# Patient Record
Sex: MALE | Race: WHITE | NOT HISPANIC OR LATINO
[De-identification: names, ages, dates, MRNs, and addresses within clinical notes are randomized per-mention and may not be internally consistent; named-entity substitution may affect disease eponyms.]

---

## 2018-11-19 PROBLEM — Z00.00 ENCOUNTER FOR PREVENTIVE HEALTH EXAMINATION: Status: ACTIVE | Noted: 2018-11-19

## 2019-02-04 ENCOUNTER — MEDICATION RENEWAL (OUTPATIENT)
Age: 74
End: 2019-02-04

## 2019-02-12 PROBLEM — M19.90 ARTHRITIS: Status: ACTIVE | Noted: 2019-02-12

## 2019-02-12 PROBLEM — Z87.891 FORMER SMOKER: Status: ACTIVE | Noted: 2019-02-12

## 2019-02-12 PROBLEM — Z85.79 HISTORY OF LYMPHOMA: Status: RESOLVED | Noted: 2019-02-12 | Resolved: 2019-02-12

## 2019-02-12 PROBLEM — Z86.19 HISTORY OF LYME DISEASE: Status: RESOLVED | Noted: 2019-02-12 | Resolved: 2019-02-12

## 2019-02-12 RX ORDER — ASPIRIN 81 MG
81 TABLET, DELAYED RELEASE (ENTERIC COATED) ORAL DAILY
Refills: 0 | Status: ACTIVE | COMMUNITY

## 2019-02-12 RX ORDER — UBIDECARENONE/VIT E ACET 100MG-5
50 MCG CAPSULE ORAL
Refills: 0 | Status: ACTIVE | COMMUNITY

## 2019-02-19 ENCOUNTER — APPOINTMENT (OUTPATIENT)
Dept: HEART AND VASCULAR | Facility: CLINIC | Age: 74
End: 2019-02-19
Payer: MEDICARE

## 2019-02-19 ENCOUNTER — NON-APPOINTMENT (OUTPATIENT)
Age: 74
End: 2019-02-19

## 2019-02-19 VITALS
OXYGEN SATURATION: 98 % | BODY MASS INDEX: 25.34 KG/M2 | HEIGHT: 70.5 IN | HEART RATE: 70 BPM | DIASTOLIC BLOOD PRESSURE: 83 MMHG | WEIGHT: 179 LBS | SYSTOLIC BLOOD PRESSURE: 142 MMHG

## 2019-02-19 VITALS — SYSTOLIC BLOOD PRESSURE: 114 MMHG | DIASTOLIC BLOOD PRESSURE: 81 MMHG

## 2019-02-19 DIAGNOSIS — Z85.79 PERSONAL HISTORY OF OTHER MALIGNANT NEOPLASMS OF LYMPHOID, HEMATOPOIETIC AND RELATED TISSUES: ICD-10-CM

## 2019-02-19 DIAGNOSIS — M19.90 UNSPECIFIED OSTEOARTHRITIS, UNSPECIFIED SITE: ICD-10-CM

## 2019-02-19 DIAGNOSIS — Z87.891 PERSONAL HISTORY OF NICOTINE DEPENDENCE: ICD-10-CM

## 2019-02-19 DIAGNOSIS — Z86.19 PERSONAL HISTORY OF OTHER INFECTIOUS AND PARASITIC DISEASES: ICD-10-CM

## 2019-02-19 PROCEDURE — 93000 ELECTROCARDIOGRAM COMPLETE: CPT

## 2019-02-19 PROCEDURE — 99214 OFFICE O/P EST MOD 30 MIN: CPT | Mod: 25

## 2019-02-19 RX ORDER — PYRIDOXINE HCL (VITAMIN B6) 100 MG
100 TABLET ORAL
Refills: 0 | Status: ACTIVE | COMMUNITY

## 2019-02-19 RX ORDER — CHLORHEXIDINE GLUCONATE 4 %
1000 LIQUID (ML) TOPICAL
Refills: 0 | Status: ACTIVE | COMMUNITY

## 2019-02-19 NOTE — HISTORY OF PRESENT ILLNESS
[FreeTextEntry1] : Mr. Villalobos presents for follow up and management of moderate diffuse CAD, HTN, and pre-excitation (WPW) ECG pattern. He is following with a nephrologist at [facility] for diabetic nephropathy.  He had complaints of back pain and had an MRI that demonstrated a mass. 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 He is following with an oncologist, Sun Dye MD, at [facility] and received 3 rounds of chemotherapy which he tolerated well.  He denies chest pain or palpitations and feels generally well.  He is nearly finished with his 6 cycle course of chemotherapy (vincristine and rituxan).  His most recent PET scan revealed near resolution of the lymphoma.  At present, he feels well.  He recently returned from a trip to [place].

## 2019-02-19 NOTE — REASON FOR VISIT
[Follow-Up - Clinic] : a clinic follow-up of [FreeTextEntry1] : Diagnostic Tests:\par -----------------------------------\par ECG: \par 02/19/19: sinus rhythm, WPW pattern.  \par 05/14/18: sinus bradycardia, WPW pattern.\par 10/07/15: sinus bradycardia, WPW pattern.\par 11/18/14: NSR, LAD, WPW pattern\par 12/24/13: NSR, LAD, WPW pattern\par 10/04/11: sinus bradycardia, WPW pattern. \par -----------------------------------\par Echo: \par 09/11/18: EF 66%, mild MR, moderate TR, GLS -19.8%. \par 05/25/18: EF 61%, trace MR/TR. \par 05/14/09: EF 59%, dilated LA, mild MR/TR\par -----------------------------------\par Stress: \par 04/29/08: exercise MPI: 13 METS, EF 54%, partially reversible inferior wall defect\par -----------------------------------\par Cath: \par 05/13/08: RCA prox 50%, LAD mid 50%, D1 ostium 50%, LCX mid 60%, OM2 40%, EF 60%.

## 2019-02-19 NOTE — ASSESSMENT
[FreeTextEntry1] : 1. Pre-excitation syndrome: Pre-excitation (WPW) pattern on ECG: asymptomatic\par             - consider Holter monitor in future to screen for SVT or atrial fibrillation \par \par 2. CAD: s/p cath: 05/13/08: RCA prox 50%, LAD mid 50%, D1 ostium 50%, LCX mid 60%, OM2 40%, EF 60%, CCS 0 \par             - continue aggressive medical management\par             - continue ASA 81mg po daily\par             - will send for an echocardiogram \par \par 3. HTN: BP at ACC/AHA 2017 guideline target\par             - continue valsartan/HCT 80/12.5mg 0.5 tablet po daily\par \par 4. Dyslipidemia: lipids at goal\par             - continue rosuvastatin 40mg po daily\par             - discussed with patient therapeutic lifestyle changes to improve lipid metabolism.\par \par 5. Diabetes type II:  \par             - continue trulicity\par             - discussed with patient therapeutic lifestyle changes to improve glucose metabolism\par \par 6. Non-Hodgkin lymphoma: Spinal tumor, low-grade lymphoma, s/p surgical excision: \par             - follow up with oncologist, Sun Dye MD at [place]. \par \par

## 2019-02-19 NOTE — PHYSICAL EXAM
[General Appearance - Well Developed] : well developed [Normal Appearance] : normal appearance [Well Groomed] : well groomed [General Appearance - Well Nourished] : well nourished [No Deformities] : no deformities [General Appearance - In No Acute Distress] : no acute distress [Normal Conjunctiva] : the conjunctiva exhibited no abnormalities [Eyelids - No Xanthelasma] : the eyelids demonstrated no xanthelasmas [Normal Oral Mucosa] : normal oral mucosa [No Oral Pallor] : no oral pallor [No Oral Cyanosis] : no oral cyanosis [Normal Jugular Venous A Waves Present] : normal jugular venous A waves present [Normal Jugular Venous V Waves Present] : normal jugular venous V waves present [No Jugular Venous Garcia A Waves] : no jugular venous garcai A waves [Respiration, Rhythm And Depth] : normal respiratory rhythm and effort [Exaggerated Use Of Accessory Muscles For Inspiration] : no accessory muscle use [Auscultation Breath Sounds / Voice Sounds] : lungs were clear to auscultation bilaterally [Normal Rate] : normal [Normal S1] : normal S1 [Normal S2] : normal S2 [No Murmur] : no murmurs heard [2+] : left 2+ [No Abnormalities] : the abdominal aorta was not enlarged and no bruit was heard [No Pitting Edema] : no pitting edema present [Abdomen Soft] : soft [Abdomen Tenderness] : non-tender [Abdomen Mass (___ Cm)] : no abdominal mass palpated [Abnormal Walk] : normal gait [Gait - Sufficient For Exercise Testing] : the gait was sufficient for exercise testing [Nail Clubbing] : no clubbing of the fingernails [Cyanosis, Localized] : no localized cyanosis [Petechial Hemorrhages (___cm)] : no petechial hemorrhages [Skin Color & Pigmentation] : normal skin color and pigmentation [] : no rash [No Venous Stasis] : no venous stasis [Skin Lesions] : no skin lesions [No Skin Ulcers] : no skin ulcer [No Xanthoma] : no  xanthoma was observed [Oriented To Time, Place, And Person] : oriented to person, place, and time [Affect] : the affect was normal [Mood] : the mood was normal [No Anxiety] : not feeling anxious [S3] : no S3 [S4] : no S4 [Right Carotid Bruit] : no bruit heard over the right carotid [Left Carotid Bruit] : no bruit heard over the left carotid [Right Femoral Bruit] : no bruit heard over the right femoral artery [Left Femoral Bruit] : no bruit heard over the left femoral artery

## 2019-02-27 ENCOUNTER — APPOINTMENT (OUTPATIENT)
Dept: HEART AND VASCULAR | Facility: CLINIC | Age: 74
End: 2019-02-27
Payer: MEDICARE

## 2019-02-27 VITALS
BODY MASS INDEX: 24.77 KG/M2 | TEMPERATURE: 98 F | DIASTOLIC BLOOD PRESSURE: 68 MMHG | HEIGHT: 70.5 IN | OXYGEN SATURATION: 99 % | SYSTOLIC BLOOD PRESSURE: 128 MMHG | WEIGHT: 175 LBS | HEART RATE: 68 BPM

## 2019-02-27 PROCEDURE — 93306 TTE W/DOPPLER COMPLETE: CPT

## 2019-06-20 ENCOUNTER — APPOINTMENT (OUTPATIENT)
Dept: HEART AND VASCULAR | Facility: CLINIC | Age: 74
End: 2019-06-20
Payer: MEDICARE

## 2019-06-20 VITALS
DIASTOLIC BLOOD PRESSURE: 71 MMHG | OXYGEN SATURATION: 100 % | WEIGHT: 178 LBS | HEIGHT: 70 IN | SYSTOLIC BLOOD PRESSURE: 135 MMHG | HEART RATE: 58 BPM | BODY MASS INDEX: 25.48 KG/M2 | TEMPERATURE: 98.2 F

## 2019-06-20 VITALS — DIASTOLIC BLOOD PRESSURE: 76 MMHG | SYSTOLIC BLOOD PRESSURE: 124 MMHG

## 2019-06-20 PROCEDURE — 99214 OFFICE O/P EST MOD 30 MIN: CPT

## 2019-06-20 NOTE — ASSESSMENT
[FreeTextEntry1] : 1. Pre-excitation syndrome: Pre-excitation (WPW) pattern on ECG: asymptomatic\par             - consider Holter monitor in future to screen for SVT or atrial fibrillation \par \par 2. CAD: s/p cath: 05/13/08: RCA prox 50%, LAD mid 50%, D1 ostium 50%, LCX mid 60%, OM2 40%, EF 60%, CCS 0 \par             - continue aggressive medical management\par             - continue ASA 81mg po daily\par \par 3. HTN: BP at ACC/AHA 2017 guideline target\par             - continue valsartan/HCT 80/12.5mg 0.5 tablet po daily\par \par 4. Dyslipidemia: lipids at goal\par             - continue rosuvastatin 40mg po daily\par             - discussed with patient therapeutic lifestyle changes to improve lipid metabolism.\par \par 5. Diabetes type II:  \par             - continue trulicity\par             - discussed with patient therapeutic lifestyle changes to improve glucose metabolism\par \par 6. Non-Hodgkin lymphoma: Spinal tumor, low-grade lymphoma, s/p surgical excision: \par             - follow up with oncologist, Sun Dye MD at [place]. \par \par 7. Mitral regurgitation: moderate:\par             - will follow with serial echocardiograms\par \par 8. CKD stage IV:\par             - follow up with nephrologist\par             - avoid nephrotoxic agents\par \par 9. r/o SOPHIA\par             - will send for a carotid artery sonogram \par \par

## 2019-06-20 NOTE — HISTORY OF PRESENT ILLNESS
[FreeTextEntry1] : Mr. Villalobos presents for follow up and management of moderate diffuse CAD, HTN, and pre-excitation (WPW) ECG pattern. He is following with a nephrologist at [facility] for diabetic nephropathy.  He had complaints of back pain and had an MRI that demonstrated a mass. 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 He is following with an oncologist, Sun Dye MD, at [facility] and received 3 rounds of chemotherapy which he tolerated well.  He denies chest pain or palpitations and feels generally well.  He is nearly finished with his 6 cycle course of chemotherapy (vincristine and rituxan).  His most recent PET scan revealed near resolution of the lymphoma.  At present, he feels well.

## 2019-06-20 NOTE — REASON FOR VISIT
[FreeTextEntry1] : Diagnostic Tests:\par -----------------------------------\par ECG: \par 02/19/19: sinus rhythm, WPW pattern.  \par 05/14/18: sinus bradycardia, WPW pattern.\par 10/07/15: sinus bradycardia, WPW pattern.\par 11/18/14: NSR, LAD, WPW pattern\par 12/24/13: NSR, LAD, WPW pattern\par 10/04/11: sinus bradycardia, WPW pattern. \par -----------------------------------\par Echo: \par 02/27/19: EF 58%, moderately dilated LA, moderate MR, trace AI, mild TR.\par 09/11/18: EF 66%, mild MR, moderate TR, GLS -19.8%. \par 05/25/18: EF 61%, trace MR/TR. \par 05/14/09: EF 59%, dilated LA, mild MR/TR\par -----------------------------------\par Stress: \par 04/29/08: exercise MPI: 13 METS, EF 54%, partially reversible inferior wall defect\par -----------------------------------\par Cath: \par 05/13/08: RCA prox 50%, LAD mid 50%, D1 ostium 50%, LCX mid 60%, OM2 40%, EF 60%.

## 2019-06-26 ENCOUNTER — APPOINTMENT (OUTPATIENT)
Dept: HEART AND VASCULAR | Facility: CLINIC | Age: 74
End: 2019-06-26
Payer: MEDICARE

## 2019-06-26 VITALS
OXYGEN SATURATION: 97 % | TEMPERATURE: 98.4 F | SYSTOLIC BLOOD PRESSURE: 124 MMHG | HEART RATE: 60 BPM | DIASTOLIC BLOOD PRESSURE: 69 MMHG

## 2019-06-26 VITALS — SYSTOLIC BLOOD PRESSURE: 125 MMHG | DIASTOLIC BLOOD PRESSURE: 69 MMHG

## 2019-06-26 PROCEDURE — 93880 EXTRACRANIAL BILAT STUDY: CPT

## 2019-06-27 DIAGNOSIS — E04.1 NONTOXIC SINGLE THYROID NODULE: ICD-10-CM

## 2019-07-09 ENCOUNTER — OUTPATIENT (OUTPATIENT)
Dept: OUTPATIENT SERVICES | Facility: HOSPITAL | Age: 74
LOS: 1 days | End: 2019-07-09

## 2019-07-09 ENCOUNTER — APPOINTMENT (OUTPATIENT)
Dept: ULTRASOUND IMAGING | Facility: CLINIC | Age: 74
End: 2019-07-09
Payer: MEDICARE

## 2019-07-09 PROCEDURE — 76536 US EXAM OF HEAD AND NECK: CPT | Mod: 26

## 2019-07-24 ENCOUNTER — MEDICATION RENEWAL (OUTPATIENT)
Age: 74
End: 2019-07-24

## 2019-10-22 ENCOUNTER — APPOINTMENT (OUTPATIENT)
Dept: HEART AND VASCULAR | Facility: CLINIC | Age: 74
End: 2019-10-22
Payer: MEDICARE

## 2019-10-22 ENCOUNTER — NON-APPOINTMENT (OUTPATIENT)
Age: 74
End: 2019-10-22

## 2019-10-22 VITALS
HEIGHT: 70 IN | BODY MASS INDEX: 25.2 KG/M2 | HEART RATE: 63 BPM | TEMPERATURE: 98.1 F | OXYGEN SATURATION: 98 % | DIASTOLIC BLOOD PRESSURE: 74 MMHG | SYSTOLIC BLOOD PRESSURE: 125 MMHG | WEIGHT: 176 LBS

## 2019-10-22 PROCEDURE — 93000 ELECTROCARDIOGRAM COMPLETE: CPT

## 2019-10-22 PROCEDURE — 99214 OFFICE O/P EST MOD 30 MIN: CPT | Mod: 25

## 2019-10-22 NOTE — REASON FOR VISIT
[FreeTextEntry1] : Diagnostic Tests:\par -----------------------------------\par ECG: \par 10/22/19: sinus rhythm, WPW pattern, PVC. \par 02/19/19: sinus rhythm, WPW pattern.  \par 05/14/18: sinus bradycardia, WPW pattern.\par 10/07/15: sinus bradycardia, WPW pattern.\par 11/18/14: NSR, LAD, WPW pattern\par 12/24/13: NSR, LAD, WPW pattern\par 10/04/11: sinus bradycardia, WPW pattern. \par -----------------------------------\par Echo: \par 02/27/19: EF 58%, moderately dilated LA, moderate MR, trace AI, mild TR.\par 09/11/18: EF 66%, mild MR, moderate TR, GLS -19.8%. \par 05/25/18: EF 61%, trace MR/TR. \par 05/14/09: EF 59%, dilated LA, mild MR/TR\par -----------------------------------\par Stress: \par 04/29/08: exercise MPI: 13 METS, EF 54%, partially reversible inferior wall defect\par -----------------------------------\par Cath: \par 05/13/08: RCA prox 50%, LAD mid 50%, D1 ostium 50%, LCX mid 60%, OM2 40%, EF 60%.\par -----------------------------------\par Carotid:\par 06/26/19: sono: LICA 20-49%, SUNITA 1-19%, + thyroid cysts.

## 2019-10-22 NOTE — HISTORY OF PRESENT ILLNESS
[FreeTextEntry1] : Mr. Villalobos presents for follow up and management of moderate diffuse CAD, HTN, and pre-excitation (WPW) ECG pattern. He is following with a nephrologist at [facility] for diabetic nephropathy.  He had complaints of back pain and had an MRI that demonstrated a mass. 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 He is following with an oncologist, Sun Dye MD, at [facility] and received 3 rounds of chemotherapy which he tolerated well.  He denies chest pain or palpitations and feels generally well.  He completed a 6 cycle course of chemotherapy (vincristine and rituxan).  At present, he feels well overall with no cardiovascular complaints. He is going to a sleep institute for chronic insomnia which is worsening. He admits to waking up every hour at night.

## 2019-10-22 NOTE — ASSESSMENT
[FreeTextEntry1] : 1. Pre-excitation syndrome: Pre-excitation (WPW) pattern on ECG: asymptomatic\par             - consider Holter monitor in future to screen for SVT or atrial fibrillation \par \par 2. CAD: s/p cath: 05/13/08: RCA prox 50%, LAD mid 50%, D1 ostium 50%, LCX mid 60%, OM2 40%, EF 60%, CCS 0 \par             - continue aggressive medical management\par             - continue ASA 81mg po daily\par \par 3. HTN: BP at ACC/AHA 2017 guideline target\par             - continue valsartan/HCT 80/12.5mg 0.5 tablet po daily\par \par 4. Dyslipidemia: lipids at goal\par             - continue rosuvastatin 40mg po daily\par             - discussed with patient therapeutic lifestyle changes to improve lipid metabolism.\par \par 5. Diabetes type II:  \par             - continue trulicity\par             - discussed with patient therapeutic lifestyle changes to improve glucose metabolism\par \par 6. Non-Hodgkin lymphoma: Spinal tumor, low-grade lymphoma, s/p surgical excision: \par             - follow up with oncologist, Sun Dye MD at [place]. \par \par 7. Mitral regurgitation: moderate:\par             - will follow with serial echocardiograms\par \par 8. CKD stage IV:\par             - follow up with nephrologist, Dr. Ohara\par             - avoid nephrotoxic agents\par \par 9. SOPHIA: 06/26/19: sono: LICA 20-49%, SUNITA 1-19%, + thyroid cysts. \par             - will follow with serial sonograms \par \par

## 2019-12-18 ENCOUNTER — RX RENEWAL (OUTPATIENT)
Age: 74
End: 2019-12-18

## 2020-05-05 ENCOUNTER — APPOINTMENT (OUTPATIENT)
Dept: HEART AND VASCULAR | Facility: CLINIC | Age: 75
End: 2020-05-05

## 2020-07-07 ENCOUNTER — RX RENEWAL (OUTPATIENT)
Age: 75
End: 2020-07-07

## 2020-10-14 ENCOUNTER — RX RENEWAL (OUTPATIENT)
Age: 75
End: 2020-10-14

## 2020-11-03 ENCOUNTER — NON-APPOINTMENT (OUTPATIENT)
Age: 75
End: 2020-11-03

## 2020-11-03 ENCOUNTER — APPOINTMENT (OUTPATIENT)
Dept: HEART AND VASCULAR | Facility: CLINIC | Age: 75
End: 2020-11-03
Payer: MEDICARE

## 2020-11-03 VITALS — SYSTOLIC BLOOD PRESSURE: 134 MMHG | DIASTOLIC BLOOD PRESSURE: 82 MMHG

## 2020-11-03 VITALS
WEIGHT: 179 LBS | SYSTOLIC BLOOD PRESSURE: 142 MMHG | HEART RATE: 60 BPM | OXYGEN SATURATION: 99 % | BODY MASS INDEX: 25.62 KG/M2 | TEMPERATURE: 97.9 F | HEIGHT: 70 IN | DIASTOLIC BLOOD PRESSURE: 72 MMHG

## 2020-11-03 PROCEDURE — 93000 ELECTROCARDIOGRAM COMPLETE: CPT

## 2020-11-03 PROCEDURE — 99214 OFFICE O/P EST MOD 30 MIN: CPT | Mod: 25

## 2020-11-03 NOTE — REASON FOR VISIT
[FreeTextEntry1] : Diagnostic Tests:\par -----------------------------------\par ECG: \par 11/03/20: sinus rhythm, WPW pattern.\par 10/22/19: sinus rhythm, WPW pattern, PVC. \par 02/19/19: sinus rhythm, WPW pattern.  \par 05/14/18: sinus bradycardia, WPW pattern.\par 10/07/15: sinus bradycardia, WPW pattern.\par 11/18/14: NSR, LAD, WPW pattern\par 12/24/13: NSR, LAD, WPW pattern\par 10/04/11: sinus bradycardia, WPW pattern. \par -----------------------------------\par Echo: \par 02/27/19: EF 58%, moderately dilated LA, moderate MR, trace AI, mild TR.\par 09/11/18: EF 66%, mild MR, moderate TR, GLS -19.8%. \par 05/25/18: EF 61%, trace MR/TR. \par 05/14/09: EF 59%, dilated LA, mild MR/TR\par -----------------------------------\par Stress: \par 04/29/08: exercise MPI: 13 METS, EF 54%, partially reversible inferior wall defect\par -----------------------------------\par Cath: \par 05/13/08: RCA prox 50%, LAD mid 50%, D1 ostium 50%, LCX mid 60%, OM2 40%, EF 60%.\par -----------------------------------\par Carotid:\par 06/26/19: sono: LICA 20-49%, SUNITA 1-19%, + thyroid cysts.

## 2020-11-03 NOTE — ASSESSMENT
[FreeTextEntry1] : 1. Pre-excitation syndrome: Pre-excitation (WPW) pattern on ECG: asymptomatic\par             - continue conservative management\par \par 2. CAD: s/p cath: 05/13/08: RCA prox 50%, LAD mid 50%, D1 ostium 50%, LCX mid 60%, OM2 40%, EF 60%\par             - continue aggressive medical management\par             - continue ASA 81mg po daily\par             - will send for an echocardiogram\par \par 3. HTN: BP at ACC/AHA 2017 guideline target\par             - continue valsartan/HCT 80/12.5mg 0.5 tablet po daily\par \par 4. Dyslipidemia: lipids at goal, LDL 71 (10/26/20)\par             - continue rosuvastatin 40mg po daily\par             - discussed with patient therapeutic lifestyle changes to improve lipid metabolism.\par \par 5. Diabetes type II:  \par             - continue trulicity 0.75mg SQ weekly\par             - continue glimepiride 1mg p QD \par             - discussed with patient therapeutic lifestyle changes to improve glucose metabolism\par \par 6. Non-Hodgkin lymphoma: Spinal tumor, low-grade lymphoma, s/p surgical excision: \par             - follow up with oncologist, Sun Dye MD at [place]. \par \par 7. Mitral regurgitation: moderate:\par             - will follow with serial echocardiograms\par \par 8. CKD stage IV:Cr 2.47, GFR 25 (10/26/20)\par             - follow up with nephrologist, Eddie Ohara MD\par             - avoid nephrotoxic agents\par \par 9. SOPHIA: 06/26/19: sono: LICA 20-49%, SUNITA 1-19%, + thyroid cysts. \par             - will follow with serial sonograms (ordered today)\par \par

## 2020-11-03 NOTE — HISTORY OF PRESENT ILLNESS
[FreeTextEntry1] : Mr. Villalobos presents for follow up and management of moderate diffuse CAD, HTN, and pre-excitation (WPW) ECG pattern. He is following with a nephrologist at [facility] for diabetic nephropathy.  He had complaints of back pain and had an MRI that demonstrated a mass. 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 He is following with an oncologist, Sun Dye MD, at [facility] and received 3 rounds of chemotherapy which he tolerated well.  He denies chest pain or palpitations and feels generally well.  He completed a 6 cycle course of chemotherapy (vincristine and rituxan).  At present, he feels well overall with no cardiovascular complaints.

## 2020-11-18 ENCOUNTER — APPOINTMENT (OUTPATIENT)
Dept: HEART AND VASCULAR | Facility: CLINIC | Age: 75
End: 2020-11-18
Payer: MEDICARE

## 2020-11-18 ENCOUNTER — NON-APPOINTMENT (OUTPATIENT)
Age: 75
End: 2020-11-18

## 2020-11-18 PROCEDURE — 93880 EXTRACRANIAL BILAT STUDY: CPT

## 2020-11-18 PROCEDURE — 93306 TTE W/DOPPLER COMPLETE: CPT

## 2020-11-18 PROCEDURE — ZZZZZ: CPT

## 2021-07-13 ENCOUNTER — RX RENEWAL (OUTPATIENT)
Age: 76
End: 2021-07-13

## 2021-09-27 ENCOUNTER — RX RENEWAL (OUTPATIENT)
Age: 76
End: 2021-09-27

## 2021-12-08 ENCOUNTER — RX RENEWAL (OUTPATIENT)
Age: 76
End: 2021-12-08

## 2021-12-10 ENCOUNTER — RX RENEWAL (OUTPATIENT)
Age: 76
End: 2021-12-10

## 2021-12-28 ENCOUNTER — RX RENEWAL (OUTPATIENT)
Age: 76
End: 2021-12-28

## 2022-01-04 ENCOUNTER — APPOINTMENT (OUTPATIENT)
Dept: HEART AND VASCULAR | Facility: CLINIC | Age: 77
End: 2022-01-04
Payer: MEDICARE

## 2022-01-04 ENCOUNTER — NON-APPOINTMENT (OUTPATIENT)
Age: 77
End: 2022-01-04

## 2022-01-04 VITALS
SYSTOLIC BLOOD PRESSURE: 121 MMHG | HEART RATE: 61 BPM | BODY MASS INDEX: 22.12 KG/M2 | TEMPERATURE: 96.9 F | HEIGHT: 70 IN | DIASTOLIC BLOOD PRESSURE: 72 MMHG | OXYGEN SATURATION: 100 % | WEIGHT: 154.5 LBS

## 2022-01-04 PROCEDURE — 93000 ELECTROCARDIOGRAM COMPLETE: CPT

## 2022-01-04 PROCEDURE — 99214 OFFICE O/P EST MOD 30 MIN: CPT | Mod: 25

## 2022-01-04 RX ORDER — INSULIN GLARGINE 100 [IU]/ML
100 INJECTION, SOLUTION SUBCUTANEOUS
Qty: 18 | Refills: 0 | Status: ACTIVE | COMMUNITY
Start: 2021-12-16

## 2022-01-04 NOTE — ASSESSMENT
[FreeTextEntry1] : 1. Pre-excitation syndrome: Pre-excitation (WPW) pattern on ECG: asymptomatic\par             - continue conservative management\par \par 2. CAD: s/p cath: 05/13/08: RCA prox 50%, LAD mid 50%, D1 ostium 50%, LCX mid 60%, OM2 40%, EF 60%\par             - continue aggressive medical management\par             - continue ASA 81mg po daily\par             - will send for an echocardiogram to assess LV function\par \par 3. HTN: BP at ACC/AHA 2017 guideline target\par             - continue valsartan/HCT 80/12.5mg 0.5 tablet po daily\par \par 4. Dyslipidemia: lipids at goal: \par             - continue rosuvastatin 40mg po daily\par             - discussed with patient therapeutic lifestyle changes to improve lipid metabolism\par             - patient will provide copy of recent lab work from PMD's office for my review \par \par 5. Diabetes type II:  \par             - continue trulicity 1.5mg SQ weekly\par             - continue insulin\par             - discussed with patient therapeutic lifestyle changes to improve glucose metabolism\par             - follow up with endocrinologist, Demetrio White MD at [facility]\par \par 6. Non-Hodgkin lymphoma: Spinal tumor, low-grade lymphoma, s/p surgical excision: \par             - follow up with oncologist, Sun Dye MD at [place]. \par \par 7. Mitral regurgitation: moderate:\par             - will follow with serial echocardiograms (ordered today) \par \par 8. CKD stage IV:Cr 2.47, GFR 24 (10/01/21)\par             - follow up with nephrologist, Eddie Ohara MD at [facility]\par             - avoid nephrotoxic agents\par \par 9. SOPHIA: 01/18/20: sono: LICA 20-49%, SUNITA 1-19%, + thyroid cysts. \par             - will follow with serial sonograms \par \par \par An ECG was obtained to evaluate heart rhythm and screen for any underlying cardiac abnormalities. \par

## 2022-01-04 NOTE — HISTORY OF PRESENT ILLNESS
[FreeTextEntry1] : Mr. Villalobos presents for follow up and management of moderate diffuse CAD, HTN, and pre-excitation (WPW) ECG pattern. He is following with a nephrologist at [facility] for diabetic nephropathy.  He had complaints of back pain and had an MRI that demonstrated a mass. 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 He is following with an oncologist, Sun Dye MD, at [facility]. He completed a 6 cycle course of chemotherapy (vincristine and rituxan).  He had an echocardiogram on 11/18/20 which revealed an EF of 60% and mild MR.  At present, he feels well overall with no cardiovascular complaints. He will travel for two weeks to [place] and 1.5 months to [place] during the winter.

## 2022-01-04 NOTE — REASON FOR VISIT
[FreeTextEntry1] : Diagnostic Tests:\par -----------------------------------\par ECG: \par 01/04/22: sinus rhythm, WPW pattern.\par 11/03/20: sinus rhythm, WPW pattern.\par 10/22/19: sinus rhythm, WPW pattern, PVC. \par 02/19/19: sinus rhythm, WPW pattern.  \par 05/14/18: sinus bradycardia, WPW pattern.\par 10/07/15: sinus bradycardia, WPW pattern.\par 11/18/14: NSR, LAD, WPW pattern\par 12/24/13: NSR, LAD, WPW pattern\par 10/04/11: sinus bradycardia, WPW pattern. \par -----------------------------------\par Echo: \par 11/18/20: EF 60%,mild MR. \par 02/27/19: EF 58%, moderately dilated LA, moderate MR, trace AI, mild TR.\par 09/11/18: EF 66%, mild MR, moderate TR, GLS -19.8%. \par 05/25/18: EF 61%, trace MR/TR. \par 05/14/09: EF 59%, dilated LA, mild MR/TR\par -----------------------------------\par Stress: \par 04/29/08: exercise MPI: 13 METS, EF 54%, partially reversible inferior wall defect\par -----------------------------------\par Cath: \par 05/13/08: RCA prox 50%, LAD mid 50%, D1 ostium 50%, LCX mid 60%, OM2 40%, EF 60%.\par -----------------------------------\par Carotid:\par 1/18/20: sono: b/l ICA 1-19% stenosis. \par 06/26/19: sono: LICA 20-49%, SUNITA 1-19%, + thyroid cysts.

## 2022-04-04 ENCOUNTER — NON-APPOINTMENT (OUTPATIENT)
Age: 77
End: 2022-04-04

## 2022-04-04 ENCOUNTER — APPOINTMENT (OUTPATIENT)
Dept: HEART AND VASCULAR | Facility: CLINIC | Age: 77
End: 2022-04-04
Payer: MEDICARE

## 2022-04-04 PROCEDURE — 93306 TTE W/DOPPLER COMPLETE: CPT

## 2022-06-30 ENCOUNTER — RX RENEWAL (OUTPATIENT)
Age: 77
End: 2022-06-30

## 2023-01-14 NOTE — PHYSICAL EXAM
[S3] : no S3 [S4] : no S4 [Left Carotid Bruit] : no bruit heard over the left carotid [Right Femoral Bruit] : no bruit heard over the right femoral artery [Right Carotid Bruit] : no bruit heard over the right carotid [Left Femoral Bruit] : no bruit heard over the left femoral artery - - -

## 2023-05-02 ENCOUNTER — NON-APPOINTMENT (OUTPATIENT)
Age: 78
End: 2023-05-02

## 2023-05-02 ENCOUNTER — APPOINTMENT (OUTPATIENT)
Dept: HEART AND VASCULAR | Facility: CLINIC | Age: 78
End: 2023-05-02
Payer: MEDICARE

## 2023-05-02 VITALS
BODY MASS INDEX: 23.19 KG/M2 | HEIGHT: 70 IN | DIASTOLIC BLOOD PRESSURE: 80 MMHG | SYSTOLIC BLOOD PRESSURE: 156 MMHG | TEMPERATURE: 96.9 F | OXYGEN SATURATION: 96 % | WEIGHT: 162 LBS | HEART RATE: 64 BPM

## 2023-05-02 VITALS — DIASTOLIC BLOOD PRESSURE: 82 MMHG | SYSTOLIC BLOOD PRESSURE: 155 MMHG

## 2023-05-02 PROCEDURE — 99215 OFFICE O/P EST HI 40 MIN: CPT | Mod: 25

## 2023-05-02 PROCEDURE — 93000 ELECTROCARDIOGRAM COMPLETE: CPT

## 2023-05-02 RX ORDER — ATORVASTATIN CALCIUM 40 MG/1
40 TABLET, FILM COATED ORAL DAILY
Qty: 90 | Refills: 3 | Status: ACTIVE | COMMUNITY
Start: 2023-05-02 | End: 1900-01-01

## 2023-05-02 NOTE — HISTORY OF PRESENT ILLNESS
[FreeTextEntry1] : Mr. Villalobos presents for follow up and management of moderate diffuse CAD, HTN, pre-excitation (WPW) ECG pattern, low grade lymphoma (s/p chemotherapy, vincristine and rituxan), CKD IV, and chronic anemia. He is following with a nephrologist at [facility] for diabetic nephropathy.  He had complaints of back pain and had an MRI that demonstrated a mass. 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 He is following with an oncologist, Sun Dye MD, at [facility]. He completed a 6 cycle course of chemotherapy (vincristine and rituxan).  On 04/04/22, he had an echocardiogram which revealed an EF of 63% and mild MR.  At the time of his routine follow up with his oncologist, his lab work revealed severe ERYN on CKD.  He was sent in and [place] [facility] [place] 04/19/23 with ERYN on CKD (creatinine 6.1, K 6.0) .  He was found to have severe right hydronephrosis.  On 04/13/23, he underwent IR placement of a nephrostomy tube which was repositioned on 04/18/23.  He was discharged on 04/19/23, and will have an outpatient MR cholangiogram on 05/09/23 to rule out a malignant stricture of the right ureter.  In addition, he is scheduled for a cystoscopy/uteroscopy on 05/16/23.  His valsartan/HCT was discontinued and the dose of his rosuvastatin was renally adjusted to 10mg po qd.

## 2023-05-02 NOTE — PHYSICAL EXAM
[___ +] : bilateral [unfilled]U+ pretibial pitting edema [S3] : no S3 [S4] : no S4 [Right Carotid Bruit] : no bruit heard over the right carotid [Left Carotid Bruit] : no bruit heard over the left carotid [Right Femoral Bruit] : no bruit heard over the right femoral artery [Left Femoral Bruit] : no bruit heard over the left femoral artery

## 2023-05-02 NOTE — REASON FOR VISIT
[FreeTextEntry1] : Diagnostic Tests:\par -----------------------------------\par ECG: \par 05/02/23: sinus rhythm, WPW pattern.\par 01/04/22: sinus rhythm, WPW pattern.\par 11/03/20: sinus rhythm, WPW pattern.\par 10/22/19: sinus rhythm, WPW pattern, PVC. \par 02/19/19: sinus rhythm, WPW pattern.  \par 05/14/18: sinus bradycardia, WPW pattern.\par 10/07/15: sinus bradycardia, WPW pattern.\par 11/18/14: NSR, LAD, WPW pattern\par 12/24/13: NSR, LAD, WPW pattern\par 10/04/11: sinus bradycardia, WPW pattern. \par -----------------------------------\par Echo: \par 04/04/22: EF 63%, mild MR. \par 11/18/20: EF 60%, mild MR. \par 02/27/19: EF 58%, moderately dilated LA, moderate MR, trace AI, mild TR.\par 09/11/18: EF 66%, mild MR, moderate TR, GLS -19.8%. \par 05/25/18: EF 61%, trace MR/TR. \par 05/14/09: EF 59%, dilated LA, mild MR/TR\par -----------------------------------\par Stress: \par 04/29/08: exercise MPI: 13 METS, EF 54%, partially reversible inferior wall defect\par -----------------------------------\par Cath: \par 05/13/08: RCA prox 50%, LAD mid 50%, D1 ostium 50%, LCX mid 60%, OM2 40%, EF 60%.\par -----------------------------------\par Carotid:\par 1/18/20: sono: b/l ICA 1-19% stenosis. \par 06/26/19: sono: LICA 20-49%, SUNITA 1-19%, + thyroid cysts.

## 2023-05-02 NOTE — ASSESSMENT
[FreeTextEntry1] : 1. 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 CAD: s/p cath: 05/13/08: RCA prox 50%, LAD mid 50%, D1 ostium 50%, LCX mid 60%, OM2 40%, EF 60%: \par             - continue aggressive medical management\par             - continue ASA 81mg po daily\par             - will send for an echocardiogram to assess LV function\par \par 3. HTN: BP not at ACC/AHA 2017 guideline target: \par             - valsartan/HCT 80/12.5mg discontinued secondary to ERYN/CKD\par             - will readdress BP next visit when creatinine stabilizes\par \par 4. Dyslipidemia: lipids at goal: \par             - change rosuvastatin 10mg po daily to atorvastatin 40mg po qd to avoid need for renal dosing with higher doses of rosuvastatin \par             - discussed with patient therapeutic lifestyle changes to improve lipid metabolism\par \par 5. Diabetes type II:  \par             - Trulicity 1.5mg SQ weekly was discontinued last hospital admission\par             - continue insulin\par             - discussed with patient therapeutic lifestyle changes to improve glucose metabolism\par             - follow up with endocrinologist, Talon Cox MD at [facility]\par \par 6. Non-Hodgkin lymphoma: spinal tumor, low-grade lymphoma, s/p surgical excision: \par             - follow up with oncologist, Sun Dye MD at [facility]\par \par 7. Mitral regurgitation: moderate:\par             - will follow with serial echocardiograms (ordered today) \par \par 8. CKD stage IV with recent ERYN: Cr 4.43 (05/01/23): secondary to ureteral obstruction: \par             - follow up with nephrologist, Eddie Ohara MD at [facility]\par             - follow up with urologist, Adi Giron MD\par             - avoid nephrotoxic agents\par             - will have cystoscopy/uteroscopy on 05/16/23\par             - will have an outpatient MR cholangiogram on 05/09/23 to rule out a malignant stricture of the right ureter\par \par 9. SOPHIA: 01/18/20: sono: LICA 20-49%, SUNITA 1-19%, + thyroid cysts. \par             - will follow with serial sonograms \par \par 10. Anemia, normocytic, severe: ? secondary to CKD: (Hgb 8.6, MCV 94, 05/01/23): \par              - follow up with hematologist\par \par \par I spent > 60 minutes of total time on this visit, including time spent face-to-face and non face-to-face.  During this time, I took a relevant history and examined the patient.  I reviewed relevant portions of her medical record and formulated a differential diagnosis and plan.  I explained the relevant cardiac diagnoses to the patient, as well as the work up and management plan.  I answered all questions related to the patient's cardiac conditions. \par \par \par

## 2023-05-18 ENCOUNTER — NON-APPOINTMENT (OUTPATIENT)
Age: 78
End: 2023-05-18

## 2023-06-07 PROBLEM — E11.37X2 TYPE 2 DIABETES MELLITUS WITH DIABETIC MACULAR EDEMA OF LEFT EYE RESOLVED AFTER TREATMENT, WITHOUT LONG-TERM CURRENT USE OF INSULIN: Status: RESOLVED | Noted: 2019-02-12 | Resolved: 2023-06-07

## 2023-06-08 ENCOUNTER — APPOINTMENT (OUTPATIENT)
Dept: HEART AND VASCULAR | Facility: CLINIC | Age: 78
End: 2023-06-08
Payer: MEDICARE

## 2023-06-08 VITALS
TEMPERATURE: 98 F | OXYGEN SATURATION: 98 % | WEIGHT: 168.31 LBS | DIASTOLIC BLOOD PRESSURE: 90 MMHG | HEART RATE: 94 BPM | SYSTOLIC BLOOD PRESSURE: 169 MMHG | BODY MASS INDEX: 24.09 KG/M2 | HEIGHT: 70 IN

## 2023-06-08 VITALS — DIASTOLIC BLOOD PRESSURE: 87 MMHG | SYSTOLIC BLOOD PRESSURE: 164 MMHG

## 2023-06-08 DIAGNOSIS — E11.22 TYPE 2 DIABETES MELLITUS WITH DIABETIC CHRONIC KIDNEY DISEASE: ICD-10-CM

## 2023-06-08 DIAGNOSIS — N18.2 TYPE 2 DIABETES MELLITUS WITH DIABETIC CHRONIC KIDNEY DISEASE: ICD-10-CM

## 2023-06-08 DIAGNOSIS — E11.37X2 TYPE 2 DIABETES MELLITUS WITH DIABETIC MACULAR EDEMA, RESOLVED FOLLOWING TREATMENT, LEFT EYE: ICD-10-CM

## 2023-06-08 PROCEDURE — 99215 OFFICE O/P EST HI 40 MIN: CPT

## 2023-06-08 RX ORDER — BUMETANIDE 2 MG/1
2 TABLET ORAL TWICE DAILY
Qty: 180 | Refills: 3 | Status: ACTIVE | COMMUNITY
Start: 2023-06-08 | End: 1900-01-01

## 2023-06-08 NOTE — REASON FOR VISIT
[FreeTextEntry1] : Diagnostic Tests:\par -----------------------------------\par ECG: \par 05/02/23: sinus rhythm, WPW pattern.\par 01/04/22: sinus rhythm, WPW pattern.\par 11/03/20: sinus rhythm, WPW pattern.\par 10/22/19: sinus rhythm, WPW pattern, PVC. \par 02/19/19: sinus rhythm, WPW pattern.  \par 05/14/18: sinus bradycardia, WPW pattern.\par 10/07/15: sinus bradycardia, WPW pattern.\par 11/18/14: NSR, LAD, WPW pattern\par 12/24/13: NSR, LAD, WPW pattern\par 10/04/11: sinus bradycardia, WPW pattern. \par -----------------------------------\par Echo: \par 05/15/23: EF 55%, mild LVH, grade I diastolic dysfunction, mildly dilated LA, aortic sclerosis, mild-to-moderate MR, mild TR, PASP 41 mmHg.  \par 04/04/22: EF 63%, mild MR. \par 11/18/20: EF 60%, mild MR. \par 02/27/19: EF 58%, moderately dilated LA, moderate MR, trace AI, mild TR.\par 09/11/18: EF 66%, mild MR, moderate TR, GLS -19.8%. \par 05/25/18: EF 61%, trace MR/TR. \par 05/14/09: EF 59%, dilated LA, mild MR/TR\par -----------------------------------\par Stress: \par 04/29/08: exercise MPI: 13 METS, EF 54%, partially reversible inferior wall defect\par -----------------------------------\par Cath: \par 05/13/08: RCA prox 50%, LAD mid 50%, D1 ostium 50%, LCX mid 60%, OM2 40%, EF 60%.\par -----------------------------------\par Carotid:\par 1/18/20: sono: b/l ICA 1-19% stenosis. \par 06/26/19: sono: LICA 20-49%, SUNITA 1-19%, + thyroid cysts.

## 2023-06-08 NOTE — ASSESSMENT
[FreeTextEntry1] : 1. 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 CAD: s/p cath: 05/13/08: RCA prox 50%, LAD mid 50%, D1 ostium 50%, LCX mid 60%, OM2 40%, EF 60%: \par             - continue aggressive medical management\par             - continue ASA 81mg po daily\par \par 3. HTN: BP not at ACC/AHA 2017 guideline target: \par             - will readdress BP next visit when more euvolemic\par \par 4. Dyslipidemia: lipids at goal: \par             - continue atorvastatin 40mg po qd to avoid need for renal dosing with higher doses of rosuvastatin \par             - discussed with patient therapeutic lifestyle changes to improve lipid metabolism\par \par 5. Diabetes type II:  \par             - continue insulin\par             - discussed with patient therapeutic lifestyle changes to improve glucose metabolism\par             - follow up with endocrinologist, Talon Cox MD at [place]\par \par 6. Non-Hodgkin lymphoma: spinal tumor, low-grade lymphoma, s/p surgical excision: \par             - follow up with oncologist, Sun Dye MD at [facility]\par \par 7. Mitral regurgitation: moderate:\par             - will follow with serial echocardiograms (ordered today) \par \par 8. CKD stage V with recent ERYN: Cr 4.43 (05/01/23): \par             - was previously following with a nephrologist, Eddie Ohara MD at [facility] but would like to see a new nephrologist (given the contact information for Lele Kennedy MD at [facility])\par             - follow up with urologist, Adi Giron MD\par             - avoid nephrotoxic agents\par             - will likely need renal replacment therapy in the near future (? candidate for peritoneal dialysis) \par \par 9. Carotid artery atherosclerosis: 01/18/20: sono: LICA 20-49%, SUNITA 1-19%, + thyroid cysts. \par             - will follow with serial carotid artery sonograms \par \par 10. Anemia, normocytic, severe: ? secondary to CKD: (Hgb 8.6, MCV 94, 05/01/23): \par             - follow up with hematologist\par \par 11. Edema and volume overload: ? dry weight around 155 pounds:\par              - change furosemide 40mg po qd prn to bumetanide 2mg po bid prn          \par     \par \par \par I spent > 60 minutes of total time on this visit, including time spent face-to-face and non face-to-face.  During this time, I took a relevant history and examined the patient.  I reviewed relevant portions of her medical record and formulated a differential diagnosis and plan.  I explained the relevant cardiac diagnoses to the patient, as well as the work up and management plan.  I answered all questions related to the patient's cardiac conditions. \par \par \par

## 2023-06-08 NOTE — HISTORY OF PRESENT ILLNESS
[FreeTextEntry1] : Mr. Villalobos presents for follow up and management of moderate diffuse CAD, HTN, pre-excitation (WPW) ECG pattern, low grade lymphoma (s/p chemotherapy, vincristine and rituxan), CKD V, and chronic anemia. He had complaints of back pain and had an MRI that demonstrated a mass. 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 He is following with an oncologist, Sun Dye MD, at [facility]. He completed a 6 cycle course of chemotherapy (vincristine and rituxan).  At the time of his routine follow up with his oncologist, his lab work revealed severe ERYN on CKD.  He was sent in and admitted to [facility] from 04/11/23 to 04/19/23 with ERYN on CKD (creatinine 6.1, K 6.0).  He was found to have severe right hydronephrosis.  On 04/13/23, he underwent IR placement of a nephrostomy tube which was repositioned on 04/18/23.  He was discharged on 04/19/23, and had an outpatient MR cholangiogram on 05/09/23 which ruled out a malignant stricture of the right ureter. He was readmitted to [facility] from 05/11/23 to 05/17/23 with pyelonephritis.  He underwent cystoscopy/uteroscopy on 05/16/23.  At present, he has marked edema, GARCIA, and a persistent nonproductive cough.  We had an extensive discussion today about the likely need to pursue renal replacement therapy in the near future.

## 2023-06-29 ENCOUNTER — APPOINTMENT (OUTPATIENT)
Dept: NEPHROLOGY | Facility: CLINIC | Age: 78
End: 2023-06-29
Payer: MEDICARE

## 2023-06-29 VITALS
SYSTOLIC BLOOD PRESSURE: 158 MMHG | WEIGHT: 166 LBS | BODY MASS INDEX: 23.77 KG/M2 | HEIGHT: 70 IN | DIASTOLIC BLOOD PRESSURE: 90 MMHG | HEART RATE: 84 BPM

## 2023-06-29 PROCEDURE — 99205 OFFICE O/P NEW HI 60 MIN: CPT

## 2023-06-29 NOTE — REVIEW OF SYSTEMS
[Feeling Tired] : feeling tired [Recent Weight Loss (___ Lbs)] : recent [unfilled] ~Ulb weight loss [Negative] : Heme/Lymph [FreeTextEntry2] : diuresed 20 lbs

## 2023-06-29 NOTE — CONSULT LETTER
[Dear  ___] : Dear  [unfilled], [Consult Letter:] : I had the pleasure of evaluating your patient, [unfilled]. [Please see my note below.] : Please see my note below. [Consult Closing:] : Thank you very much for allowing me to participate in the care of this patient.  If you have any questions, please do not hesitate to contact me. [Sincerely,] : Sincerely, [FreeTextEntry2] : Dr Lucas Randhawa [FreeTextEntry1] : Jean Paul Zavala -I hope all is well.  Just had dinner this week with my old friend Steven Griffin.  Best regards, Sunny [FreeTextEntry3] : Sincerely, \par \par Lele Kennedy MD, FACP\par  [DrJeanette  ___] : Dr. SALAZAR [DrJeanette ___] : Dr. SALAZAR

## 2023-06-29 NOTE — PHYSICAL EXAM
[General Appearance - Alert] : alert [General Appearance - In No Acute Distress] : in no acute distress [Sclera] : the sclera and conjunctiva were normal [PERRL With Normal Accommodation] : pupils were equal in size, round, and reactive to light [Outer Ear] : the ears and nose were normal in appearance [Neck Appearance] : the appearance of the neck was normal [Neck Cervical Mass (___cm)] : no neck mass was observed [Jugular Venous Distention Increased] : there was no jugular-venous distention [Heart Rate And Rhythm] : heart rate was normal and rhythm regular [Heart Sounds] : normal S1 and S2 [Heart Sounds Gallop] : no gallops [Murmurs] : no murmurs [Heart Sounds Pericardial Friction Rub] : no pericardial rub [FreeTextEntry1] : 1-2+ bilat [Skin Color & Pigmentation] : normal skin color and pigmentation [Skin Turgor] : normal skin turgor [] : no rash [Deep Tendon Reflexes (DTR)] : deep tendon reflexes were 2+ and symmetric [No Focal Deficits] : no focal deficits [Oriented To Time, Place, And Person] : oriented to person, place, and time [Impaired Insight] : insight and judgment were intact [Affect] : the affect was normal

## 2023-06-29 NOTE — ASSESSMENT
[FreeTextEntry1] : 77-year-old man with longstanding diabetes, recurrent kidney stones, recent worsening of renal function due to obstructive uropathy caused by a stone -background issues such as CAD, uncontrolled hypertension, anemia, etc he is not yet clinically uremic, but desperately wants to avoid dialysis.  I explained to him that it may be necessary, even to be thought of as a bridge to transplant.  I told him that age might be an obstacle at some centers and not at others.  I have given him information on at least 5 different transplant centers plus [facility] in an effort to find a living donor.  Since he is reluctant to take amlodipine because of the risk of worsening edema, I have given him Edarbi chlor 40/12.5 mg alternating with Edarbi 40.  I have asked him to increase Lokelma to 10 g every day in an effort to keep his potassium controlled.  He may require sodium bicarbonate once we see his labs.  I have ordered labs to include CMP, CBC, PTH, phosphorus, iron/TIBC, SPEP, plasma potassium.  He will return within 2 weeks.  Time spent 60 minutes

## 2023-06-29 NOTE — HISTORY OF PRESENT ILLNESS
[FreeTextEntry1] : 77-year-old man with a history of hypertension, type 2 diabetes of about 35 years duration, but with no neuropathy or retinopathy, moderate diffuse CAD, recurrent kidney stones, stage V CKD, anemia of CKD, and history of NHL in 2016, managed by Dr. Sun Dye at [facility].  His creatinine was stable at about 2.5 for many years, and then mic to about 4 after starting Farxiga last year.  He has been followed for some years by Dr. Eddie Ohara at [facility].  He was admitted to [facility] in April after labs showed a sudden rise in creatinine up to 6-7 with hyperkalemia.  It turned out that he had what sounds like right hydronephrosis as a result of a stone.  He was managed by Dr. Giron, urology, and had a stent which has since been removed.  His creatinine fell back close to baseline, and was 4.5 with a BUN of 61, GFR 13 on May 23.  His K was 4.9 with a CO2 of 21.  He was fluid overloaded and had 64+ edema with an albumin of 3.4, but he has mobilized 20 pounds on furosemide 40 mg daily given by Dr. Finkelstein.  He has been on Lokelma 10 g q. OD.  His BP was elevated at 164-169/87-90 on June 8.  He would prefer not to take amlodipine because of edema and he still has 1-2+.  His appetite is good with no nausea or vomiting.  His hemoglobin is 8.7 and he feels weak and fatigued as a result.  His endocrinologist is Dr. Talon Cox.  His PCP is Dr. Lucas Randhawa.    He desperately wants to seek a kidney transplant so we spent a great deal of time discussing possible options including the obstacle at age and underlying illnesses.  I suggested possibilities of [facility], [facility] in [place], [facility] [place] [facility] [place] because of shorter [place] waiting times, and then organization in [place] called [facility] which sometimes is able to find altruistic donors.  Anemia has been treated so far with 4 shots of Procrit and he will receive that every 2 weeks.  His last hemoglobin was 8.7

## 2023-06-30 ENCOUNTER — TRANSCRIPTION ENCOUNTER (OUTPATIENT)
Age: 78
End: 2023-06-30

## 2023-07-13 ENCOUNTER — APPOINTMENT (OUTPATIENT)
Dept: NEPHROLOGY | Facility: CLINIC | Age: 78
End: 2023-07-13
Payer: MEDICARE

## 2023-07-13 VITALS
HEIGHT: 70 IN | SYSTOLIC BLOOD PRESSURE: 136 MMHG | HEART RATE: 76 BPM | BODY MASS INDEX: 24.05 KG/M2 | DIASTOLIC BLOOD PRESSURE: 78 MMHG | WEIGHT: 168 LBS

## 2023-07-13 PROCEDURE — 99215 OFFICE O/P EST HI 40 MIN: CPT

## 2023-07-13 NOTE — HISTORY OF PRESENT ILLNESS
[FreeTextEntry1] : 77-year-old man with a history of hypertension, type 2 diabetes for 35 years duration without neuropathy or retinopathy, moderate diffuse CAD, history of recurrent kidney stones, who has now developed stage V CKD with anemia of CKD, as well as a history of NHL in 2016, managed by Dr. Sun Dye at [place].  His PCP is Dr. Lucas Randhawa, endocrinologist Talon Cox, urologist Dr Giron.  He is not yet overtly uremic, with preserved appetite, no nausea or vomiting, mild pruritus, and an increased sense of stamina and energy now that his hemoglobin has increased to 12 point 3:03 shots of Procrit.  His creatinine is 4.82, BUN 71, GFR 12, K5.1, CO2 21 on sodium bicarbonate 2 tabs twice daily.  His TF sat is only 8% so he will need IV iron infusion.  His SPEP was normal.  His IgA level was normal at 231.  His phosphorus is up to 6.0, and  with a calcium of 9.4.  I have asked him to hold off on the Procrit shot which was due today.  I explained that we do not want his hemoglobin going higher because of risk of clotting.  He does not yet have vascular access, and we have discussed in detail a number of efforts to promote kidney transplant.  In fact, an altruistic donor has come forward and he will be visiting [facility] transplant center in [facility].  He also has contacted [facility] in [place] and I signed a form indicating I am his nephrologist in the level of kidney function he is.  I added Edarbi 40 mg daily 2 weeks ago and his BP has improved.  I will send a new prescription for that.  I discussed with him the fact that we can no longer prescribe Injectafer at [facility], so it may be more efficient for him to get his iron infusion through Dr. Dye/[place].

## 2023-07-13 NOTE — ASSESSMENT
[FreeTextEntry1] : 77-year-old man with stage V CKD, whose anemia has responded dramatically to Procrit -but he is now iron deficient.  He needs IV iron infusion and we are no longer able to prescribe Injectafer here and even have difficulty getting Feraheme.  I would prefer not to use Venofer which will require 4-5 doses/infusions.  Ideally, it would be great if Dr. Dye has access to Injectafer, to have his infusion through [place].  I have ordered calcium acetate 667 mg 3 times daily with meals to lower his phosphorus.  He will remain on sodium bicarbonate 1300 mg twice daily.  I am ordering Edarbi 40 mg daily through [facility] in the hope of getting a cheaper price for him.  I have ordered labs to be repeated in 1 month prior to the next visit, to include BMP, Cystatin C, H&H, iron/TIBC, phosphorus, PTH, EB virus screen, CMV screen, and blood typing.  Time spent 45 minutes

## 2023-07-13 NOTE — CONSULT LETTER
[Dear  ___] : Dear  [unfilled], [Consult Letter:] : I had the pleasure of evaluating your patient, [unfilled]. [Please see my note below.] : Please see my note below. [Consult Closing:] : Thank you very much for allowing me to participate in the care of this patient.  If you have any questions, please do not hesitate to contact me. [Sincerely,] : Sincerely, [FreeTextEntry2] : Dr Sun Dye [FreeTextEntry1] : Dear Sun -I am wondering if you have access to Injectafer there.  I would love to see him get 750 mg IV and 1 infusion session rather than Venofer 200 mg x 4 here through [facility].  They flew loosely took Injectafer off our formulary recently.  Thanks very much, Sunny Kennedy [FreeTextEntry3] : Sincerely, \par \par Lele Kennedy MD, FACP\par  [DrJeanette  ___] : Dr. SALAZAR [DrJeanette ___] : Dr. SALAZAR

## 2023-08-15 ENCOUNTER — APPOINTMENT (OUTPATIENT)
Dept: HEART AND VASCULAR | Facility: CLINIC | Age: 78
End: 2023-08-15
Payer: MEDICARE

## 2023-08-15 VITALS
OXYGEN SATURATION: 100 % | SYSTOLIC BLOOD PRESSURE: 158 MMHG | TEMPERATURE: 97.6 F | HEIGHT: 70 IN | DIASTOLIC BLOOD PRESSURE: 67 MMHG | WEIGHT: 159.5 LBS | BODY MASS INDEX: 22.83 KG/M2 | HEART RATE: 75 BPM

## 2023-08-15 VITALS — DIASTOLIC BLOOD PRESSURE: 85 MMHG | SYSTOLIC BLOOD PRESSURE: 162 MMHG

## 2023-08-15 PROCEDURE — 99215 OFFICE O/P EST HI 40 MIN: CPT

## 2023-08-15 RX ORDER — FERUMOXYTOL 510 MG/17ML
510 INJECTION INTRAVENOUS
Refills: 0 | Status: ACTIVE | COMMUNITY
Start: 2023-08-15

## 2023-08-15 NOTE — REASON FOR VISIT
[Other: ____] : [unfilled] [FreeTextEntry1] : ============================================================= Diagnostic Tests: ----------------------------------- EC23: sinus rhythm, WPW pattern. 22: sinus rhythm, WPW pattern. 20: sinus rhythm, WPW pattern. 10/22/19: sinus rhythm, WPW pattern, PVC.  19: sinus rhythm, WPW pattern.   18: sinus bradycardia, WPW pattern. 10/07/15: sinus bradycardia, WPW pattern. 14: NSR, LAD, WPW pattern 13: NSR, LAD, WPW pattern 10/04/11: sinus bradycardia, WPW pattern.  ----------------------------------- Echo:  05/15/23: EF 55%, mild LVH, grade I diastolic dysfunction, mildly dilated LA, aortic sclerosis, mild-to-moderate MR, mild TR, PASP 41 mmHg.   22: EF 63%, mild MR.  20: EF 60%, mild MR.  19: EF 58%, moderately dilated LA, moderate MR, trace AI, mild TR. 18: EF 66%, mild MR, moderate TR, GLS -19.8%.  18: EF 61%, trace MR/TR.  09: EF 59%, dilated LA, mild MR/TR ----------------------------------- Stress:  08: exercise MPI: 13 METS, EF 54%, partially reversible inferior wall defect ----------------------------------- Cath:  08: RCA prox 50%, LAD mid 50%, D1 ostium 50%, LCX mid 60%, OM2 40%, EF 60%. ----------------------------------- Carotid: 20: sono: b/l ICA 1-19% stenosis.  19: sono: LICA 20-49%, SUNITA 1-19%, + thyroid cysts.

## 2023-08-15 NOTE — HISTORY OF PRESENT ILLNESS
[FreeTextEntry1] : Mr. Villalobos presents for follow up and management of moderate diffuse CAD, HTN, pre-excitation (WPW) ECG pattern, low grade lymphoma (s/p chemotherapy with vincristine and Rituxan), CKD V, and chronic anemia. He had complaints of back pain and had an MRI that demonstrated a mass. 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 He is following with an oncologist, Sun Dye MD, at [facility]. He completed a 6-cycle course of chemotherapy (vincristine and Rituxan).  At the time of his routine follow up with his oncologist, his lab work revealed severe ERYN on CKD.  He was sent in and admitted to [facility] [place] 04/11/23 to 04/19/23 with ERYN on CKD (creatinine 6.1, K 6.0).  He was found to have severe right hydronephrosis.  On 04/13/23, he underwent IR placement of a nephrostomy tube which was repositioned on 04/18/23.  He was discharged on 04/19/23 and had an outpatient MR cholangiogram on 05/09/23 which ruled out a malignant stricture of the right ureter. He was readmitted to [facility] from 05/11/23 to 05/17/23 with pyelonephritis.  He underwent cystoscopy/uteroscopy on 05/16/23.  Since last visit, his edema has dramatically improved.  He is receiving feromoxytol infusions for anemia at [facility].  His PMD initiated Edarbi.  He is enrolled in the renal transplant center at [facility].  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

## 2023-08-15 NOTE — PHYSICAL EXAM
[General Appearance - Well Developed] : well developed [Normal Appearance] : normal appearance [Well Groomed] : well groomed [General Appearance - Well Nourished] : well nourished [No Deformities] : no deformities [Normal Conjunctiva] : the conjunctiva exhibited no abnormalities [General Appearance - In No Acute Distress] : no acute distress [Eyelids - No Xanthelasma] : the eyelids demonstrated no xanthelasmas [Normal Oral Mucosa] : normal oral mucosa [No Oral Pallor] : no oral pallor [No Oral Cyanosis] : no oral cyanosis [Normal Jugular Venous V Waves Present] : normal jugular venous V waves present [Normal Jugular Venous A Waves Present] : normal jugular venous A waves present [No Jugular Venous Garcia A Waves] : no jugular venous garcia A waves [Respiration, Rhythm And Depth] : normal respiratory rhythm and effort [Exaggerated Use Of Accessory Muscles For Inspiration] : no accessory muscle use [Auscultation Breath Sounds / Voice Sounds] : lungs were clear to auscultation bilaterally [Abdomen Soft] : soft [Abdomen Tenderness] : non-tender [Abdomen Mass (___ Cm)] : no abdominal mass palpated [Abnormal Walk] : normal gait [Gait - Sufficient For Exercise Testing] : the gait was sufficient for exercise testing [Nail Clubbing] : no clubbing of the fingernails [Cyanosis, Localized] : no localized cyanosis [Petechial Hemorrhages (___cm)] : no petechial hemorrhages [Skin Color & Pigmentation] : normal skin color and pigmentation [] : no rash [No Venous Stasis] : no venous stasis [Skin Lesions] : no skin lesions [No Skin Ulcers] : no skin ulcer [No Xanthoma] : no  xanthoma was observed [Oriented To Time, Place, And Person] : oriented to person, place, and time [Affect] : the affect was normal [Mood] : the mood was normal [No Anxiety] : not feeling anxious [Normal Rate] : normal [Normal S1] : normal S1 [Normal S2] : normal S2 [No Murmur] : no murmurs heard [2+] : right 2+ [No Abnormalities] : the abdominal aorta was not enlarged and no bruit was heard [___ +] : bilateral [unfilled]U+ pretibial pitting edema [S3] : no S3 [S4] : no S4 [Right Carotid Bruit] : no bruit heard over the right carotid [Left Carotid Bruit] : no bruit heard over the left carotid [Right Femoral Bruit] : no bruit heard over the right femoral artery [Left Femoral Bruit] : no bruit heard over the left femoral artery

## 2023-08-15 NOTE — ASSESSMENT
[FreeTextEntry1] : ================================================================ 1. Pre-excitation (WPW) pattern on ECG: asymptomatic:              - continue conservative management  2. CAD: s/p cath: 05/13/08: RCA prox 50%, LAD mid 50%, D1 ostium 50%, LCX mid 60%, OM2 40%, EF 60%:              - continue aggressive medical management             - continue ASA 81mg po daily  3. HTN: BP not at ACC/AHA 2017 guideline target:              - continue Edarbi 40mgpo qd             - follow up with nephrologist this week and consider additional anti-HTN therapy  4. Dyslipidemia: lipids at goal:              - continue atorvastatin 40mg po qd to avoid need for renal dosing with higher doses of rosuvastatin              - discussed with patient therapeutic lifestyle changes to improve lipid metabolism  5. Diabetes type II:               - continue insulin             - discussed with patient therapeutic lifestyle changes to improve glucose metabolism             - follow up with endocrinologist, Talon Cox MD at [facility]  6. Non-Hodgkin lymphoma: spinal tumor, low-grade lymphoma, s/p surgical excision:              - follow up with oncologist, Sun Dye MD at [place]  7. Mitral regurgitation: mild-to-moderate:             - will follow with serial echocardiograms   8. CKD stage V with recent ERYN: Cr 5.27, eGFR 10 (08/15/23):              - follow up with nephrologist, Lele Kennedy MD             - follow up with urologist, Adi Giron MD             - avoid nephrotoxic agents             - continue follow up with renal transplant center at [facility]   9. Carotid artery atherosclerosis: 01/18/20: sono: LICA 20-49%, SUNITA 1-19%, + thyroid cysts.              - will follow with serial carotid artery sonograms   10. Anemia, normocytic, severe: ? secondary to CKD: (Hgb 11.6, 08/15/23):              - follow up with hematologist  11. Edema and volume overload: resolved presently 159 lbs; dry weight 155 lbs              - continue bumetanide 2mg po bid prn                  I spent > 45 minutes of total time on this visit, including time spent face-to-face and non-face-to-face.  During this time, I took a relevant history and examined the patient.  I reviewed relevant portions of her medical record and formulated a differential diagnosis and plan.  I explained the relevant cardiac diagnoses to the patient, as well as the work up and management plan.  I answered all questions related to the patient's cardiac conditions.

## 2023-08-17 ENCOUNTER — APPOINTMENT (OUTPATIENT)
Dept: NEPHROLOGY | Facility: CLINIC | Age: 78
End: 2023-08-17
Payer: MEDICARE

## 2023-08-17 VITALS
BODY MASS INDEX: 22.76 KG/M2 | DIASTOLIC BLOOD PRESSURE: 71 MMHG | WEIGHT: 159 LBS | HEART RATE: 76 BPM | SYSTOLIC BLOOD PRESSURE: 142 MMHG | HEIGHT: 70 IN

## 2023-08-17 PROCEDURE — 99215 OFFICE O/P EST HI 40 MIN: CPT

## 2023-08-17 NOTE — PHYSICAL EXAM
[General Appearance - Alert] : alert [General Appearance - In No Acute Distress] : in no acute distress [Sclera] : the sclera and conjunctiva were normal [PERRL With Normal Accommodation] : pupils were equal in size, round, and reactive to light [Outer Ear] : the ears and nose were normal in appearance [Neck Appearance] : the appearance of the neck was normal [Neck Cervical Mass (___cm)] : no neck mass was observed [Jugular Venous Distention Increased] : there was no jugular-venous distention [Heart Rate And Rhythm] : heart rate was normal and rhythm regular [Heart Sounds] : normal S1 and S2 [Heart Sounds Gallop] : no gallops [Murmurs] : no murmurs [Heart Sounds Pericardial Friction Rub] : no pericardial rub [Skin Color & Pigmentation] : normal skin color and pigmentation [Skin Turgor] : normal skin turgor [] : no rash [Deep Tendon Reflexes (DTR)] : deep tendon reflexes were 2+ and symmetric [No Focal Deficits] : no focal deficits [Oriented To Time, Place, And Person] : oriented to person, place, and time [Impaired Insight] : insight and judgment were intact [Affect] : the affect was normal

## 2023-08-17 NOTE — ASSESSMENT
[FreeTextEntry1] : 77-year-old man with hypertension and diabetes, but no neuropathy or retinopathy.  He has stage V CKD with anemia, metabolic acidosis.-Sodium bicarb will increase to 650 mg, 2 3 times daily, which should increase his CO2 and lower his K.  He will continue with transplant planning, have repeat labs in about 6 weeks, and return shortly after that. Time spent 45 min

## 2023-08-17 NOTE — HISTORY OF PRESENT ILLNESS
[FreeTextEntry1] : 77-year-old male with a history of hypertension, type 2 diabetes for 35 years, moderate diffuse CAD, history of recurrent kidney stones, who developed stage V CKD with anemia.  He also has a history of non-Hodgkin's lymphoma in 2016, managed by Dr. Sun Dye at [place].  I have spoken to her in regard to need for iron infusion because of anemia treated with Retacrit.  His PCP is Dr. Lucas Randhawa, endocrinologist is Dr. Talon Cox.  His hemoglobin has increased and is 11.6 on Retacrit.  His TF sat is up to 14 from 8.  His creatinine is now 5.27 with a BUN of 70, K5.4, CO2 19, GFR 13 by Cystatin C.  I added Edarbi for BP control, I added calcium acetate 667 Mg 3 times daily with meals to lower phosphorus.  I ordered sodium bicarb 650 mg twice daily but he is still acidotic.  I encouraged him to seek multiple transplant opportunities.  He did reach out to [facility] and they have a donor who is being evaluated now, and if successfully completes the work-up, he will be transplanted at [facility].  We reviewed his labs together and he understands that while the safety margin is narrow, it is likely to be satisfactory until the transplant is done.  He has no uremic symptoms or signs.  He did receive the first iron infusion and will be getting the second next week.  His TF sat is rising and hemoglobin is stable.  We discussed the fact that despite taking 4 sodium bicarb per day, his CO2 is still only 19.  In view of that, I asked him to increase to 6 and he is willing to try.

## 2023-09-27 ENCOUNTER — NON-APPOINTMENT (OUTPATIENT)
Age: 78
End: 2023-09-27

## 2023-10-11 ENCOUNTER — APPOINTMENT (OUTPATIENT)
Dept: NEPHROLOGY | Facility: CLINIC | Age: 78
End: 2023-10-11
Payer: MEDICARE

## 2023-10-11 VITALS
HEIGHT: 70 IN | WEIGHT: 159 LBS | BODY MASS INDEX: 22.76 KG/M2 | SYSTOLIC BLOOD PRESSURE: 132 MMHG | DIASTOLIC BLOOD PRESSURE: 64 MMHG | HEART RATE: 80 BPM

## 2023-10-11 PROCEDURE — 99215 OFFICE O/P EST HI 40 MIN: CPT

## 2023-10-13 ENCOUNTER — RX RENEWAL (OUTPATIENT)
Age: 78
End: 2023-10-13

## 2023-10-26 ENCOUNTER — APPOINTMENT (OUTPATIENT)
Dept: HEART AND VASCULAR | Facility: CLINIC | Age: 78
End: 2023-10-26
Payer: MEDICARE

## 2023-10-26 VITALS
HEIGHT: 70 IN | SYSTOLIC BLOOD PRESSURE: 164 MMHG | WEIGHT: 159 LBS | OXYGEN SATURATION: 100 % | BODY MASS INDEX: 22.76 KG/M2 | DIASTOLIC BLOOD PRESSURE: 69 MMHG | HEART RATE: 72 BPM

## 2023-10-26 PROCEDURE — 99215 OFFICE O/P EST HI 40 MIN: CPT

## 2023-10-26 RX ORDER — CARVEDILOL 6.25 MG/1
6.25 TABLET, FILM COATED ORAL TWICE DAILY
Qty: 180 | Refills: 3 | Status: ACTIVE | COMMUNITY
Start: 2023-10-26 | End: 1900-01-01

## 2023-11-27 ENCOUNTER — APPOINTMENT (OUTPATIENT)
Dept: NEPHROLOGY | Facility: CLINIC | Age: 78
End: 2023-11-27
Payer: MEDICARE

## 2023-11-27 VITALS
HEIGHT: 70 IN | WEIGHT: 158 LBS | SYSTOLIC BLOOD PRESSURE: 142 MMHG | BODY MASS INDEX: 22.62 KG/M2 | DIASTOLIC BLOOD PRESSURE: 79 MMHG

## 2023-11-27 PROCEDURE — 99215 OFFICE O/P EST HI 40 MIN: CPT

## 2023-12-19 ENCOUNTER — APPOINTMENT (OUTPATIENT)
Dept: NEPHROLOGY | Facility: CLINIC | Age: 78
End: 2023-12-19
Payer: MEDICARE

## 2023-12-19 ENCOUNTER — APPOINTMENT (OUTPATIENT)
Dept: HEART AND VASCULAR | Facility: CLINIC | Age: 78
End: 2023-12-19
Payer: MEDICARE

## 2023-12-19 ENCOUNTER — NON-APPOINTMENT (OUTPATIENT)
Age: 78
End: 2023-12-19

## 2023-12-19 VITALS — BODY MASS INDEX: 22.62 KG/M2 | HEIGHT: 70 IN | WEIGHT: 158 LBS

## 2023-12-19 VITALS
DIASTOLIC BLOOD PRESSURE: 92 MMHG | OXYGEN SATURATION: 100 % | WEIGHT: 158.38 LBS | HEIGHT: 70 IN | TEMPERATURE: 97.8 F | HEART RATE: 70 BPM | SYSTOLIC BLOOD PRESSURE: 163 MMHG | BODY MASS INDEX: 22.67 KG/M2

## 2023-12-19 VITALS — SYSTOLIC BLOOD PRESSURE: 165 MMHG | DIASTOLIC BLOOD PRESSURE: 89 MMHG

## 2023-12-19 PROCEDURE — 99443: CPT | Mod: 95

## 2023-12-19 PROCEDURE — 93000 ELECTROCARDIOGRAM COMPLETE: CPT

## 2023-12-19 PROCEDURE — 99215 OFFICE O/P EST HI 40 MIN: CPT | Mod: 25

## 2023-12-19 RX ORDER — SACUBITRIL AND VALSARTAN 49; 51 MG/1; MG/1
49-51 TABLET, FILM COATED ORAL TWICE DAILY
Qty: 180 | Refills: 3 | Status: ACTIVE | COMMUNITY
Start: 2023-12-19 | End: 1900-01-01

## 2023-12-19 NOTE — HISTORY OF PRESENT ILLNESS
[FreeTextEntry1] : Mr. 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 He had complaints of back pain and had an MRI that demonstrated a mass. 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 He is following with an oncologist, Sun Dye MD, at [facility]. He completed a 6-cycle course of chemotherapy (vincristine and Rituxan).  At the time of his routine follow up with his oncologist, his lab work revealed severe ERYN on CKD.  He was sent in and admitted to [facility] from 04/11/23 to 04/19/23 with ERYN on CKD (creatinine 6.1, K 6.0).  He was found to have severe right hydronephrosis.  On 04/13/23, he underwent IR placement of a nephrostomy tube which was repositioned on 04/18/23.  He was discharged on 04/19/23 and had an outpatient MR cholangiogram on 05/09/23 which ruled out a malignant stricture of the right ureter. He was readmitted to [facility] from 05/11/23 to 05/17/23 with pyelonephritis.  He underwent cystoscopy/uteroscopy on 05/16/23.  He is receiving feromoxytol infusions for anemia at [facility].  He is enrolled in the renal transplant center at [facility].   On 10/19/23, he had an echocardiogram at [facility] which revealed an EF of 31%, dilated LV, LVH, basal and mid inferior and inferolateral akinesis, moderately dilated LA, mild MR, mild TR, and PASP 47 mmHg.  The newly reduced EF and regional wall motion abnormalities would represent a silent IWMI at some point between the two echocardiograms, 05/15/23 and 09/20/23.  During that time, he was admitted to the hospital with urinary obstruction but does not recall any symptomatic cardiac events. We discussed timing his invasive coronary artery angiogram with his upcoming kidney transplant and trying to avoid PCI unless necessary until afterward.  His ESRD has progressed and his potential donor was recently disqualified.  We had an extensive discussion regarding his eminent need for renal replacement and the need to prepare the HD or peritoneal dialysis access.

## 2023-12-19 NOTE — CONSULT LETTER
[Dear  ___] : Dear  [unfilled], [Referral Letter:] : I am referring [unfilled] to you for further evaluation.  My most recent evaluation follows. [Please see my note below.] : Please see my note below. [Consult Closing:] : Thank you very much for allowing me to participate in the care of this patient.  If you have any questions, please do not hesitate to contact me. [Sincerely,] : Sincerely, [FreeTextEntry2] : Dr Lewis - [facility] surg - for PD cath [FreeTextEntry3] : Sincerely,   Lele Kennedy MD, FACP

## 2023-12-19 NOTE — ASSESSMENT
[FreeTextEntry1] : ================================================================ 1. 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 CAD, s/p silent IWMI (between 05/15/23 and 09/20/23):  - continue aggressive medical management  - continue ASA 81mg po daily  - continue DAPT with clopidogrel 75mg po qd  - will send for an invasive coronary artery angiogram when on renal replacement therapy   3. HTN: BP not at ACC/AHA 2017 guideline target:lilely secondary to ESRD:   - change Edarbi to Entresto 49/51mg po bid  - continue carvedilol 6.25mg po bid (as part of HF regimen)  - if BP remains above target next visit will up titrate anti-HTN regimen   4. Dyslipidemia: lipids at goal:  - continue atorvastatin 40mg po qd  - discussed with patient therapeutic lifestyle changes to improve lipid metabolism  5. Diabetes type II: A1c 6.6% (12/11/23):   - continue insulin  - discussed with patient therapeutic lifestyle changes to improve glucose metabolism  - follow up with endocrinologist, Talon Cox MD at [facility]  6. Non-Hodgkins lymphoma: spinal tumor, s/p surgical excision, marginal zone lymphoma s/p Rituxan x 4, "cured":  - follow up with oncologist, Sun Dye MD at [place]  7. Chronic systolic heart failure secondary to ischemic cardiomyopathy:  - continue carvedilol 6.25mg po bid  - change Edarbi to Entresto 49/51mg po bid  - will follow with serial echocardiograms  8. ESRD, enrolled in [facility] transplant center: Cr 7.28, eGFR 7 (12/08/23):   - follow up with nephrologist, Lele Kennedy MD  - follow up with urologist, Adi Giron MD  - avoid nephrotoxic agents  - continue follow up with renal transplant center at [facility]  - he requires renal replacement therapy at this time   9. Carotid artery atherosclerosis: 01/18/20: sono: LICA 20-49%, SUNITA 1-19%, + thyroid cysts.  - will follow with serial carotid artery sonograms  10. Anemia, normocytic, severe: ? secondary to CKD: Hgb 10.8 (12/18/23):   - follow up with hematologist  11. Edema and volume overload: resolved:  - continue bumetanide 2mg po bid prn    I spent > 45 minutes of total time on this visit, including time spent face-to-face and non-face-to-face. During this time, I took a relevant history and examined the patient. I reviewed relevant portions of her medical record and formulated a differential diagnosis and plan. I explained the relevant cardiac diagnoses to the patient, as well as the work up and management plan. I answered all questions related to the patient's cardiac conditions.

## 2023-12-19 NOTE — ASSESSMENT
[FreeTextEntry1] : 77-year-old male who we thought was about to receive a live donor transplant, but the donor was disqualified today.  So we have discussed [facility]CPD as the next option.  He will need a coronary angiogram very soon.  I will ask him to see Dr. Lewis in regard to insertion of PD catheter within the next 2 to 3 months.  He feels well now and would like to hold off until the onset of symptoms.  Will repeat labs again within 3 to 4 weeks.  Time spent 21 minutes

## 2023-12-19 NOTE — PHYSICAL EXAM
[General Appearance - Well Developed] : well developed [Normal Appearance] : normal appearance [Well Groomed] : well groomed [General Appearance - Well Nourished] : well nourished [No Deformities] : no deformities [General Appearance - In No Acute Distress] : no acute distress [Normal Conjunctiva] : the conjunctiva exhibited no abnormalities [Eyelids - No Xanthelasma] : the eyelids demonstrated no xanthelasmas [Normal Oral Mucosa] : normal oral mucosa [No Oral Pallor] : no oral pallor [No Oral Cyanosis] : no oral cyanosis [Normal Jugular Venous A Waves Present] : normal jugular venous A waves present [Normal Jugular Venous V Waves Present] : normal jugular venous V waves present [No Jugular Venous Garcia A Waves] : no jugular venous garcia A waves [Respiration, Rhythm And Depth] : normal respiratory rhythm and effort [Exaggerated Use Of Accessory Muscles For Inspiration] : no accessory muscle use [Auscultation Breath Sounds / Voice Sounds] : lungs were clear to auscultation bilaterally [Abdomen Soft] : soft [Abdomen Tenderness] : non-tender [Abdomen Mass (___ Cm)] : no abdominal mass palpated [Abnormal Walk] : normal gait [Gait - Sufficient For Exercise Testing] : the gait was sufficient for exercise testing [Nail Clubbing] : no clubbing of the fingernails [Cyanosis, Localized] : no localized cyanosis [Petechial Hemorrhages (___cm)] : no petechial hemorrhages [Skin Color & Pigmentation] : normal skin color and pigmentation [] : no rash [No Venous Stasis] : no venous stasis [Skin Lesions] : no skin lesions [No Skin Ulcers] : no skin ulcer [No Xanthoma] : no  xanthoma was observed [Oriented To Time, Place, And Person] : oriented to person, place, and time [Affect] : the affect was normal [Mood] : the mood was normal [No Anxiety] : not feeling anxious [Normal Rate] : normal [Normal S1] : normal S1 [Normal S2] : normal S2 [No Murmur] : no murmurs heard [2+] : left 2+ [No Abnormalities] : the abdominal aorta was not enlarged and no bruit was heard [___ +] : bilateral [unfilled]U+ pretibial pitting edema [S3] : no S3 [S4] : no S4 [Right Carotid Bruit] : no bruit heard over the right carotid [Left Carotid Bruit] : no bruit heard over the left carotid [Right Femoral Bruit] : no bruit heard over the right femoral artery [Left Femoral Bruit] : no bruit heard over the left femoral artery

## 2023-12-19 NOTE — REASON FOR VISIT
[Other: ____] : [unfilled] [FreeTextEntry1] : ============================================================= Diagnostic Tests: ----------------------------------- EC23: sinus rhythm, WPW pattern, single PVC.  23: sinus rhythm, WPW pattern. 22: sinus rhythm, WPW pattern. 20: sinus rhythm, WPW pattern. 10/22/19: sinus rhythm, WPW pattern, PVC.  19: sinus rhythm, WPW pattern.   18: sinus bradycardia, WPW pattern. 10/07/15: sinus bradycardia, WPW pattern. 14: NSR, LAD, WPW pattern 13: NSR, LAD, WPW pattern 10/04/11: sinus bradycardia, WPW pattern.  ----------------------------------- Echo:  10/19/23: EF 31%, dilated LV, LVH, basal and mid inferior and inferolateral akinesis, moderately dilated LA, mild MR, mild TR, PASP 47 mmHg.  05/15/23: EF 55%, mild LVH, grade I diastolic dysfunction, mildly dilated LA, aortic sclerosis, mild-to-moderate MR, mild TR, PASP 41 mmHg.   22: EF 63%, mild MR.  20: EF 60%, mild MR.  19: EF 58%, moderately dilated LA, moderate MR, trace AI, mild TR. 18: EF 66%, mild MR, moderate TR, GLS -19.8%.  18: EF 61%, trace MR/TR.  09: EF 59%, dilated LA, mild MR/TR ----------------------------------- Stress:  23: exercise MPI: 8 minutes of Enoc, large fixed inferior defect, stress EF 37%.  08: exercise MPI: 13 METS, EF 54%, partially reversible inferior wall defect ----------------------------------- Cath:  08: RCA prox 50%, LAD mid 50%, D1 ostium 50%, LCX mid 60%, OM2 40%, EF 60%. ----------------------------------- Carotid: 20: sono: b/l ICA 1-19% stenosis.  19: sono: LICA 20-49%, SUNITA 1-19%, + thyroid cysts.

## 2023-12-19 NOTE — HISTORY OF PRESENT ILLNESS
[Home] : at home, [unfilled] , at the time of the visit. [Medical Office: ([facility])___] : at the medical office located in  [Verbal consent obtained from patient] : the patient, [unfilled] [FreeTextEntry1] : Discussed with patient : You have chosen to receive care through the use of tele-media.  It enables healthcare providers at different locations to provide safe, effective, and convenient care through the use of technology.  Please note this is a billable encounter.  As with any healthcare service, there are risks associated with the use of tele-media, including  issues.  You understand that I cannot physically examine you and that you may need to come to the office to complete the assessment.   Patient agreed verbally and understands the risks and benefits of tele-media as explained.  All questions regarding tele-media encounters were answered.                       77-year-old man with stage V CKD who has not yet clinically uremic but certainly approaching the need for dialysis.  He had a potential donor were on the threshold of proceeding with kidney transplantation at [place] when he learned today that the donor has been disqualified for unknown medical reasons.  We have discussed dialysis options and he would prefer peritoneal dialysis to hemo-I explained that he would need a catheter in the abdomen and perform dialysis himself every night and he is willing.  He would also like to be able to transport the equipment to his weekend home and I told him that is feasible.  He saw Dr. Finkelstein today for cardiac follow-up and knows that he needs coronary angiography.  We have been waiting until he is actually at the point of dialysis so we do not tip him over with the use of radiocontrast.  His appetite is still good, no nausea or vomiting, he denies any dyspnea.  He does have moderate diffuse coronary disease though.  His BP was 165/89 today.  His labs this week included hemoglobin 10.8, creatinine 7.28, BUN 91, K5.6, CO2 18, GFR 7 but higher with Cystatin C.  He takes sodium bicarb 6 pills/day and is reluctant to increase further because of the size of the pills.  He has not been faithfully taking Lokelma every day so I told him he must.  I explained that acidosis makes potassium go higher.

## 2024-01-24 ENCOUNTER — APPOINTMENT (OUTPATIENT)
Dept: NEPHROLOGY | Facility: CLINIC | Age: 79
End: 2024-01-24
Payer: MEDICARE

## 2024-01-24 VITALS
HEART RATE: 84 BPM | WEIGHT: 157 LBS | SYSTOLIC BLOOD PRESSURE: 132 MMHG | DIASTOLIC BLOOD PRESSURE: 71 MMHG | HEIGHT: 70 IN | BODY MASS INDEX: 22.48 KG/M2

## 2024-01-24 PROCEDURE — G2211 COMPLEX E/M VISIT ADD ON: CPT

## 2024-01-24 PROCEDURE — 99215 OFFICE O/P EST HI 40 MIN: CPT

## 2024-01-24 NOTE — ASSESSMENT
[FreeTextEntry1] : 78-year-old man hoping to receive a kidney transplant at [place], but with no definite time or donor.  He is nearing the necessity for dialysis.  I have given him the name and phone number for Dr. Lewis, [phone].  I have explained that we would need to train him once the catheter is in.  He has not had major abdominal surgery but did have a hernia repair.  No cholecystectomy etc.  His K of 5.9 is a concern, and he feels he cannot take more sodium bicarb than he already takes.  He is not happy with low, but I asked him to increase to twice daily every other day, alternating with 1 dose.  So he will get 10 g alternating with 20.  Also in view of his worsening dyspnea and fatigue, I have asked him to arrange another Procrit shot with Dr. YAN.  He is to call me if he experiences nausea, vomiting, worsening shortness of breath, or go directly to the ER.  Time spent 45 minutes he was accompanied by his wife, who understands all of the information imported and agrees with our plan.

## 2024-01-24 NOTE — HISTORY OF PRESENT ILLNESS
[FreeTextEntry1] : 78-year-old man with stage V CKD, approaching the need for dialysis or transplant.  He had a potential kidney transplant donor and was on the threshold of receiving a kidney at [place] when he learned last month that the donor had been disqualified for unknown medical reasons.  We have discussed dialysis at length, and he prefers peritoneal dialysis to hemodialysis.  He understands he will need a catheter in the abdomen and to perform dialysis every night himself.  He is willing to do so.  He will need coronary angiography but has been waiting until he actually is at the point of dialysis so we do not precipitate premature need for dialysis.  He has moderate diffuse coronary disease.  His cardiologist is Dr. Rafa Silver.  His last creatinine 1 month ago was 7.28 with a BUN of 91, K5.6, CO2 18, GFR 7, but higher with Cystatin C.  He takes sodium bicarb, 6 pills/day of 650 mg each.  He also takes Lokelma every day.  His BP has been adequately controlled on Edarbi and carvedilol.  As well as bumetanide.  His latest labs show a creatinine of 7.1, BUN 87, glucose 182, calcium 8.2, hemoglobin 10.4, GFR 8 by creatinine, but probably at least 12 by Cystatin C, K5.9, CO2 23.  He has had a cough for several weeks and notes reduced appetite but no nausea or vomiting.  He saw his pulmonologist yesterday who felt he had some fluid in the lungs and treated him with amoxicillin but held off on steroids.  He denies any significant pruritus.  He does notice worsening GARCIA.  He has looked into transplant in [facility] but has major doubts, as he does about [facility].  His sister lives in [facility] but had transplant in [facility].  He is quite scared of needles and does not want hemodialysis.  We have repeatedly discussed peritoneal dialysis and he would prefer to go that route.  He got a call from [facility] 10 days ago, indicating that [facility] may have found a potential live donor for him, and he has had blood work in that regard.  All of that is currently pending.  We have discussed listing for transplant and places like [place] or [facility], but he feels the logistics may be too complex.  I have given him the name of Dr. Lewis to discuss PD catheter insertion and he would hopefully home train at [facility] dialysis on [address]

## 2024-01-24 NOTE — CONSULT LETTER
[Dear  ___] : Dear  [unfilled], [Consult Letter:] : I had the pleasure of evaluating your patient, [unfilled]. [FreeTextEntry2] : Dr Sun Dye - [facility] [FreeTextEntry1] : Hi -his hemoglobin has dropped to 10.4 and he is more symptomatic.  Would you mind administering Procrit again?  Thanks very much, Sunny Kennedy [DrJeanette  ___] : Dr. SALAZAR

## 2024-01-24 NOTE — PHYSICAL EXAM
[General Appearance - Alert] : alert [General Appearance - In No Acute Distress] : in no acute distress [Sclera] : the sclera and conjunctiva were normal [PERRL With Normal Accommodation] : pupils were equal in size, round, and reactive to light [Outer Ear] : the ears and nose were normal in appearance [Neck Appearance] : the appearance of the neck was normal [Neck Cervical Mass (___cm)] : no neck mass was observed [Jugular Venous Distention Increased] : there was no jugular-venous distention [Heart Rate And Rhythm] : heart rate was normal and rhythm regular [Heart Sounds Gallop] : no gallops [Heart Sounds] : normal S1 and S2 [Murmurs] : no murmurs [Heart Sounds Pericardial Friction Rub] : no pericardial rub [Skin Color & Pigmentation] : normal skin color and pigmentation [Skin Turgor] : normal skin turgor [] : no rash [Deep Tendon Reflexes (DTR)] : deep tendon reflexes were 2+ and symmetric [No Focal Deficits] : no focal deficits [Oriented To Time, Place, And Person] : oriented to person, place, and time [Impaired Insight] : insight and judgment were intact [Affect] : the affect was normal

## 2024-02-09 ENCOUNTER — NON-APPOINTMENT (OUTPATIENT)
Age: 79
End: 2024-02-09

## 2024-02-09 NOTE — ADDENDUM
[FreeTextEntry1] : 02/09/24: Mr. Villalobos is scheduled for peritoneal dialysis catheter insertion with Dr. Talon Lewis and Papi Calderón.  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  The procedure is not high-risk.  He had a nuclear myocardial perfusion stress test on 09/20/23 which did not reveal inducible ischemia.  He may, therefore, proceed with surgery without cardiac contraindications. He may hold aspirin and clopidogrel for 5 days prior to the procedure and resume 1 day post procedure provided any bleeding has subsided.

## 2024-02-09 NOTE — HISTORY OF PRESENT ILLNESS
[FreeTextEntry1] : Mr. 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 He had complaints of back pain and had an MRI that demonstrated a mass. 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 He is following with an oncologist, Sun Dye MD, at [facility]. He completed a 6-cycle course of chemotherapy (vincristine and Rituxan).  At the time of his routine follow up with his oncologist, his lab work revealed severe ERYN on CKD.  He was sent in and admitted to [facility] from 04/11/23 to 04/19/23 with ERYN on CKD (creatinine 6.1, K 6.0).  He was found to have severe right hydronephrosis.  On 04/13/23, he underwent IR placement of a nephrostomy tube which was repositioned on 04/18/23.  He was discharged on 04/19/23 and had an outpatient MR cholangiogram on 05/09/23 which ruled out a malignant stricture of the right ureter. He was readmitted to [facility] from 05/11/23 to 05/17/23 with pyelonephritis.  He underwent cystoscopy/uteroscopy on 05/16/23.  He is receiving feromoxytol infusions for anemia at [facility].  He is enrolled in the renal transplant center at [facility].   On 10/19/23, he had an echocardiogram at [facility] which revealed an EF of 31%, dilated LV, LVH, basal and mid inferior and inferolateral akinesis, moderately dilated LA, mild MR, mild TR, and PASP 47 mmHg.  The newly reduced EF and regional wall motion abnormalities would represent a silent IWMI at some point between the two echocardiograms, 05/15/23 and 09/20/23.  During that time, he was admitted to the hospital with urinary obstruction but does not recall any symptomatic cardiac events. We discussed timing his invasive coronary artery angiogram with his upcoming kidney transplant and trying to avoid PCI unless necessary until afterward.  His ESRD has progressed and his potential donor was recently disqualified.  We had an extensive discussion regarding his eminent need for renal replacement and the need to prepare the HD or peritoneal dialysis access.

## 2024-02-09 NOTE — PHYSICAL EXAM
[General Appearance - Well Developed] : well developed [Normal Appearance] : normal appearance [Well Groomed] : well groomed [General Appearance - Well Nourished] : well nourished [No Deformities] : no deformities [General Appearance - In No Acute Distress] : no acute distress [Normal Conjunctiva] : the conjunctiva exhibited no abnormalities [Eyelids - No Xanthelasma] : the eyelids demonstrated no xanthelasmas [Normal Oral Mucosa] : normal oral mucosa [No Oral Pallor] : no oral pallor [No Oral Cyanosis] : no oral cyanosis [Normal Jugular Venous A Waves Present] : normal jugular venous A waves present [Normal Jugular Venous V Waves Present] : normal jugular venous V waves present [No Jugular Venous Garcia A Waves] : no jugular venous garcia A waves [Respiration, Rhythm And Depth] : normal respiratory rhythm and effort [Exaggerated Use Of Accessory Muscles For Inspiration] : no accessory muscle use [Auscultation Breath Sounds / Voice Sounds] : lungs were clear to auscultation bilaterally [Abdomen Soft] : soft [Abdomen Tenderness] : non-tender [Abdomen Mass (___ Cm)] : no abdominal mass palpated [Gait - Sufficient For Exercise Testing] : the gait was sufficient for exercise testing [Abnormal Walk] : normal gait [Nail Clubbing] : no clubbing of the fingernails [Cyanosis, Localized] : no localized cyanosis [Petechial Hemorrhages (___cm)] : no petechial hemorrhages [Skin Color & Pigmentation] : normal skin color and pigmentation [] : no rash [No Venous Stasis] : no venous stasis [Skin Lesions] : no skin lesions [No Skin Ulcers] : no skin ulcer [No Xanthoma] : no  xanthoma was observed [Oriented To Time, Place, And Person] : oriented to person, place, and time [Mood] : the mood was normal [Affect] : the affect was normal [No Anxiety] : not feeling anxious [Normal Rate] : normal [Normal S1] : normal S1 [Normal S2] : normal S2 [S3] : no S3 [S4] : no S4 [No Murmur] : no murmurs heard [Right Carotid Bruit] : no bruit heard over the right carotid [Left Carotid Bruit] : no bruit heard over the left carotid [Right Femoral Bruit] : no bruit heard over the right femoral artery [Left Femoral Bruit] : no bruit heard over the left femoral artery [2+] : left 2+ [No Abnormalities] : the abdominal aorta was not enlarged and no bruit was heard

## 2024-02-09 NOTE — REASON FOR VISIT
[Other: ____] : [unfilled] [FreeTextEntry1] : ============================================================= Diagnostic Tests: -------------------------------------------------------------- EC23: sinus rhythm, WPW pattern, single PVC.  23: sinus rhythm, WPW pattern. 22: sinus rhythm, WPW pattern. 20: sinus rhythm, WPW pattern. 10/22/19: sinus rhythm, WPW pattern, PVC.  19: sinus rhythm, WPW pattern.   18: sinus bradycardia, WPW pattern. 10/07/15: sinus bradycardia, WPW pattern. 14: NSR, LAD, WPW pattern 13: NSR, LAD, WPW pattern 10/04/11: sinus bradycardia, WPW pattern.  -------------------------------------------------------------- Echo:  10/19/23: EF 31%, dilated LV, LVH, basal and mid inferior and inferolateral akinesis, moderately dilated LA, mild MR, mild TR, PASP 47 mmHg.  05/15/23: EF 55%, mild LVH, grade I diastolic dysfunction, mildly dilated LA, aortic sclerosis, mild-to-moderate MR, mild TR, PASP 41 mmHg.   22: EF 63%, mild MR.  20: EF 60%, mild MR.  19: EF 58%, moderately dilated LA, moderate MR, trace AI, mild TR. 18: EF 66%, mild MR, moderate TR, GLS -19.8%.  18: EF 61%, trace MR/TR.  09: EF 59%, dilated LA, mild MR/TR -------------------------------------------------------------- Stress:  23: exercise MPI: 8 minutes of Enoc, large fixed inferior defect, stress EF 37%.  08: exercise MPI: 13 METS, EF 54%, partially reversible inferior wall defect -------------------------------------------------------------- Cath:  08: RCA prox 50%, LAD mid 50%, D1 ostium 50%, LCX mid 60%, OM2 40%, EF 60%. -------------------------------------------------------------- Carotid: 20: sono: b/l ICA 1-19% stenosis.  19: sono: LICA 20-49%, SUNITA 1-19%, + thyroid cysts.

## 2024-02-09 NOTE — ASSESSMENT
[FreeTextEntry1] : ================================================================ 1. 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 CAD, s/p silent IWMI (between 05/15/23 and 09/20/23):  - continue aggressive medical management  - continue ASA 81mg po daily  - continue DAPT with clopidogrel 75mg po qd  - will send for an invasive coronary artery angiogram when on renal replacement therapy   3. HTN: BP not at ACC/AHA 2017 guideline target:lilely secondary to ESRD:   - change Edarbi to Entresto 49/51mg po bid  - continue carvedilol 6.25mg po bid (as part of HF regimen)  - if BP remains above target next visit will up titrate anti-HTN regimen   4. Dyslipidemia: lipids at goal:  - continue atorvastatin 40mg po qd  - discussed with patient therapeutic lifestyle changes to improve lipid metabolism  5. Diabetes type II: A1c 6.6% (12/11/23):   - continue insulin  - discussed with patient therapeutic lifestyle changes to improve glucose metabolism  - follow up with endocrinologist, Talon Cox [place] [facility]  6. Non-Hodgkins lymphoma: spinal tumor, s/p surgical excision, marginal zone lymphoma s/p Rituxan x 4, "cured":  - follow up with oncologist, Sun Dye MD at [place]  7. Chronic systolic heart failure secondary to ischemic cardiomyopathy:  - continue carvedilol 6.25mg po bid  - change Edarbi to Entresto 49/51mg po bid  - will follow with serial echocardiograms  8. ESRD, enrolled in [facility] transplant center: Cr 7.28, eGFR 7 (12/08/23):   - follow up with nephrologist, Lele Kennedy MD  - follow up with urologist, Adi Giron MD  - avoid nephrotoxic agents  - continue follow up with renal transplant center at [facility]  - he requires renal replacement therapy at this time   9. Carotid artery atherosclerosis: 01/18/20: sono: LICA 20-49%, SUNITA 1-19%, + thyroid cysts.  - will follow with serial carotid artery sonograms  10. Anemia, normocytic, severe: ? secondary to CKD: Hgb 10.8 (12/18/23):   - follow up with hematologist  11. Edema and volume overload: resolved:  - continue bumetanide 2mg po bid prn    I spent > 45 minutes of total time on this visit, including time spent face-to-face and non-face-to-face. During this time, I took a relevant history and examined the patient. I reviewed relevant portions of her medical record and formulated a differential diagnosis and plan. I explained the relevant cardiac diagnoses to the patient, as well as the work up and management plan. I answered all questions related to the patient's cardiac conditions.

## 2024-02-26 ENCOUNTER — APPOINTMENT (OUTPATIENT)
Dept: NEPHROLOGY | Facility: CLINIC | Age: 79
End: 2024-02-26
Payer: MEDICARE

## 2024-02-26 VITALS
BODY MASS INDEX: 22.48 KG/M2 | SYSTOLIC BLOOD PRESSURE: 134 MMHG | HEIGHT: 70 IN | WEIGHT: 157 LBS | DIASTOLIC BLOOD PRESSURE: 73 MMHG

## 2024-02-26 DIAGNOSIS — N25.81 SECONDARY HYPERPARATHYROIDISM OF RENAL ORIGIN: ICD-10-CM

## 2024-02-26 PROCEDURE — 99214 OFFICE O/P EST MOD 30 MIN: CPT

## 2024-02-26 PROCEDURE — G2211 COMPLEX E/M VISIT ADD ON: CPT

## 2024-02-26 NOTE — ASSESSMENT
[FreeTextEntry1] : 78-year-old man with end-stage renal disease who will have PD catheter insertion in 3 days, followed by PD training at [facility].  His care will then be assumed by the remainder of our group including Dr. Carvalho.  He has no asterixis or pericardial friction rub.  He will continue on Lokelma until he starts dialysis.  He was told the next order would cost him $375 I told him he should not need any more -he has enough to last for now.

## 2024-02-26 NOTE — PHYSICAL EXAM
[General Appearance - In No Acute Distress] : in no acute distress [General Appearance - Alert] : alert [Sclera] : the sclera and conjunctiva were normal [PERRL With Normal Accommodation] : pupils were equal in size, round, and reactive to light [Outer Ear] : the ears and nose were normal in appearance [Neck Appearance] : the appearance of the neck was normal [Neck Cervical Mass (___cm)] : no neck mass was observed [Jugular Venous Distention Increased] : there was no jugular-venous distention [Heart Rate And Rhythm] : heart rate was normal and rhythm regular [Heart Sounds] : normal S1 and S2 [Heart Sounds Gallop] : no gallops [Heart Sounds Pericardial Friction Rub] : no pericardial rub [Murmurs] : no murmurs [Skin Turgor] : normal skin turgor [Skin Color & Pigmentation] : normal skin color and pigmentation [] : no rash [Deep Tendon Reflexes (DTR)] : deep tendon reflexes were 2+ and symmetric [No Focal Deficits] : no focal deficits [Affect] : the affect was normal [Oriented To Time, Place, And Person] : oriented to person, place, and time [Impaired Insight] : insight and judgment were intact

## 2024-02-26 NOTE — HISTORY OF PRESENT ILLNESS
[FreeTextEntry1] : 78-year-old man with stage V CKD, a number of cardiac problems followed by Dr. Dennis Finkelstein.  His creatinine is now up to 9.0, BUN 98, blood sugar 188, GFR 5 by creatinine but 11 by Cystatin C, K5.1, CO2 21, hemoglobin 10.6, calcium 9.9, phosphorus 6.9 despite calcium acetate, and PTH only 9.  BP is managed with Edarbi, carvedilol, bumetanide.  He is on Entresto for CHF.  He takes Lokelma for potassium control.  I started him on Retacrit.  He is scheduled to have PD catheter insertion in 3 days, then wait a week and start PD training utilizing the new catheter.  We thought he had a transplant donor but that fell through so right now there is no immediate plans for transplant.  He is unlikely to get a   donor kidney at age 78.  He is realistic about that.  Although his labs have worsened considerably, he actually feels better the last week than he did prior to that when he was very fatigued and dyspneic.  He also has a cough.   His appetite is poor but there is no nausea or vomiting he understands that I will no longer be caring for him after he starts dialysis, and that our group will continue to see him.  He will see Dr. Carvalho in our office in about 1 month and will be followed at [facility] dialysis through PD training.  He has no edema.

## 2024-03-04 PROBLEM — C85.90 NON-HODGKIN LYMPHOMA, UNSPECIFIED, UNSPECIFIED SITE: Chronic | Status: ACTIVE | Noted: 2024-02-28

## 2024-03-04 PROBLEM — I10 ESSENTIAL (PRIMARY) HYPERTENSION: Chronic | Status: ACTIVE | Noted: 2024-02-28

## 2024-03-04 PROBLEM — I50.22 CHRONIC SYSTOLIC (CONGESTIVE) HEART FAILURE: Chronic | Status: ACTIVE | Noted: 2024-02-28

## 2024-03-04 PROBLEM — I25.10 ATHEROSCLEROTIC HEART DISEASE OF NATIVE CORONARY ARTERY WITHOUT ANGINA PECTORIS: Chronic | Status: ACTIVE | Noted: 2024-02-28

## 2024-03-04 PROBLEM — N18.5 CHRONIC KIDNEY DISEASE, STAGE 5: Chronic | Status: ACTIVE | Noted: 2024-02-28

## 2024-03-04 PROBLEM — E78.5 HYPERLIPIDEMIA, UNSPECIFIED: Chronic | Status: ACTIVE | Noted: 2024-02-28

## 2024-03-04 PROBLEM — D64.9 ANEMIA, UNSPECIFIED: Chronic | Status: ACTIVE | Noted: 2024-02-28

## 2024-03-04 PROBLEM — E11.9 TYPE 2 DIABETES MELLITUS WITHOUT COMPLICATIONS: Chronic | Status: ACTIVE | Noted: 2024-02-28

## 2024-03-08 ENCOUNTER — RX RENEWAL (OUTPATIENT)
Age: 79
End: 2024-03-08

## 2024-03-08 RX ORDER — CALCIUM ACETATE 667 MG/1
667 CAPSULE ORAL 3 TIMES DAILY
Qty: 270 | Refills: 0 | Status: ACTIVE | COMMUNITY
Start: 2023-07-13 | End: 1900-01-01

## 2024-03-12 ENCOUNTER — APPOINTMENT (OUTPATIENT)
Dept: HEART AND VASCULAR | Facility: CLINIC | Age: 79
End: 2024-03-12
Payer: MEDICARE

## 2024-03-12 VITALS
WEIGHT: 153.13 LBS | SYSTOLIC BLOOD PRESSURE: 132 MMHG | BODY MASS INDEX: 21.92 KG/M2 | HEIGHT: 70 IN | DIASTOLIC BLOOD PRESSURE: 61 MMHG | HEART RATE: 69 BPM | OXYGEN SATURATION: 98 %

## 2024-03-12 DIAGNOSIS — E87.20 ACIDOSIS, UNSPECIFIED: ICD-10-CM

## 2024-03-12 PROBLEM — I21.9 ACUTE MYOCARDIAL INFARCTION, UNSPECIFIED: Chronic | Status: ACTIVE | Noted: 2024-03-05

## 2024-03-12 PROBLEM — Z87.442 PERSONAL HISTORY OF URINARY CALCULI: Chronic | Status: ACTIVE | Noted: 2024-03-05

## 2024-03-12 PROBLEM — Z86.79 PERSONAL HISTORY OF OTHER DISEASES OF THE CIRCULATORY SYSTEM: Chronic | Status: ACTIVE | Noted: 2024-03-05

## 2024-03-12 PROCEDURE — 99215 OFFICE O/P EST HI 40 MIN: CPT

## 2024-03-12 PROCEDURE — G2211 COMPLEX E/M VISIT ADD ON: CPT

## 2024-03-13 ENCOUNTER — RESULT REVIEW (OUTPATIENT)
Age: 79
End: 2024-03-13

## 2024-03-13 ENCOUNTER — APPOINTMENT (OUTPATIENT)
Dept: INTERVENTIONAL RADIOLOGY/VASCULAR | Facility: HOSPITAL | Age: 79
End: 2024-03-13
Payer: MEDICARE

## 2024-03-13 ENCOUNTER — OUTPATIENT (OUTPATIENT)
Dept: OUTPATIENT SERVICES | Facility: HOSPITAL | Age: 79
LOS: 1 days | End: 2024-03-13
Payer: MEDICARE

## 2024-03-13 DIAGNOSIS — Z98.890 OTHER SPECIFIED POSTPROCEDURAL STATES: Chronic | ICD-10-CM

## 2024-03-13 PROCEDURE — 77001 FLUOROGUIDE FOR VEIN DEVICE: CPT

## 2024-03-13 PROCEDURE — 71046 X-RAY EXAM CHEST 2 VIEWS: CPT | Mod: 26

## 2024-03-13 PROCEDURE — 71046 X-RAY EXAM CHEST 2 VIEWS: CPT

## 2024-03-13 PROCEDURE — C1769: CPT

## 2024-03-13 PROCEDURE — 77001 FLUOROGUIDE FOR VEIN DEVICE: CPT | Mod: 26

## 2024-03-13 PROCEDURE — C1750: CPT

## 2024-03-13 PROCEDURE — 76937 US GUIDE VASCULAR ACCESS: CPT | Mod: 26

## 2024-03-13 PROCEDURE — 76937 US GUIDE VASCULAR ACCESS: CPT

## 2024-03-13 PROCEDURE — 36556 INSERT NON-TUNNEL CV CATH: CPT

## 2024-03-14 ENCOUNTER — APPOINTMENT (OUTPATIENT)
Dept: INTERVENTIONAL RADIOLOGY/VASCULAR | Facility: HOSPITAL | Age: 79
End: 2024-03-14

## 2024-03-14 ENCOUNTER — APPOINTMENT (OUTPATIENT)
Dept: SURGERY | Facility: HOSPITAL | Age: 79
End: 2024-03-14

## 2024-03-14 LAB
HBV SURFACE AB SER QL: NONREACTIVE
HBV SURFACE AG SER QL: NONREACTIVE

## 2024-03-15 LAB — SARS-COV-2 N GENE NPH QL NAA+PROBE: NOT DETECTED

## 2024-03-18 LAB
M TB IFN-G BLD-IMP: NEGATIVE
QUANTIFERON TB PLUS MITOGEN MINUS NIL: 2.97 IU/ML
QUANTIFERON TB PLUS NIL: 0.02 IU/ML
QUANTIFERON TB PLUS TB1 MINUS NIL: 0 IU/ML
QUANTIFERON TB PLUS TB2 MINUS NIL: 0 IU/ML

## 2024-03-26 ENCOUNTER — NON-APPOINTMENT (OUTPATIENT)
Age: 79
End: 2024-03-26

## 2024-03-29 ENCOUNTER — APPOINTMENT (OUTPATIENT)
Dept: NEPHROLOGY | Facility: CLINIC | Age: 79
End: 2024-03-29
Payer: MEDICARE

## 2024-03-29 VITALS — RESPIRATION RATE: 12 BRPM | DIASTOLIC BLOOD PRESSURE: 61 MMHG | HEART RATE: 70 BPM | SYSTOLIC BLOOD PRESSURE: 110 MMHG

## 2024-03-29 DIAGNOSIS — N18.9 CHRONIC KIDNEY DISEASE, UNSPECIFIED: ICD-10-CM

## 2024-03-29 DIAGNOSIS — Z87.442 PERSONAL HISTORY OF URINARY CALCULI: ICD-10-CM

## 2024-03-29 DIAGNOSIS — D63.1 CHRONIC KIDNEY DISEASE, UNSPECIFIED: ICD-10-CM

## 2024-03-29 DIAGNOSIS — E87.5 HYPERKALEMIA: ICD-10-CM

## 2024-03-29 PROCEDURE — G2211 COMPLEX E/M VISIT ADD ON: CPT

## 2024-03-29 PROCEDURE — 99215 OFFICE O/P EST HI 40 MIN: CPT

## 2024-04-02 PROBLEM — E87.5 HYPERKALEMIA: Status: ACTIVE | Noted: 2023-10-11

## 2024-04-02 PROBLEM — N18.9 ANEMIA IN CHRONIC KIDNEY DISEASE: Status: ACTIVE | Noted: 2023-06-29

## 2024-04-02 PROBLEM — Z87.442 HISTORY OF KIDNEY STONES: Status: ACTIVE | Noted: 2019-02-12

## 2024-04-02 NOTE — PHYSICAL EXAM
[General Appearance - Alert] : alert [Sclera] : the sclera and conjunctiva were normal [General Appearance - In No Acute Distress] : in no acute distress [Extraocular Movements] : extraocular movements were intact [PERRL With Normal Accommodation] : pupils were equal in size, round, and reactive to light [Outer Ear] : the ears and nose were normal in appearance [Oropharynx] : the oropharynx was normal [Neck Cervical Mass (___cm)] : no neck mass was observed [Neck Appearance] : the appearance of the neck was normal [Thyroid Diffuse Enlargement] : the thyroid was not enlarged [Jugular Venous Distention Increased] : there was no jugular-venous distention [Thyroid Nodule] : there were no palpable thyroid nodules [Auscultation Breath Sounds / Voice Sounds] : lungs were clear to auscultation bilaterally [Heart Rate And Rhythm] : heart rate was normal and rhythm regular [Heart Sounds] : normal S1 and S2 [Heart Sounds Gallop] : no gallops [Murmurs] : no murmurs [Heart Sounds Pericardial Friction Rub] : no pericardial rub [Edema] : there was no peripheral edema [Veins - Varicosity Changes] : there were no varicosital changes [Bowel Sounds] : normal bowel sounds [Abdomen Soft] : soft [Abdomen Tenderness] : non-tender [Abdomen Mass (___ Cm)] : no abdominal mass palpated [No CVA Tenderness] : no ~M costovertebral angle tenderness [Abnormal Walk] : normal gait [No Spinal Tenderness] : no spinal tenderness [Nail Clubbing] : no clubbing  or cyanosis of the fingernails [Musculoskeletal - Swelling] : no joint swelling seen [Motor Tone] : muscle strength and tone were normal [Dialysis Catheter] : dialysis catheter [Benign] : appears benign [Skin Color & Pigmentation] : normal skin color and pigmentation [Skin Turgor] : normal skin turgor [] : no rash [No Focal Deficits] : no focal deficits [Cranial Nerves] : cranial nerves 2-12 were intact [Affect] : the affect was normal [Mood] : the mood was normal

## 2024-04-02 NOTE — ASSESSMENT
[FreeTextEntry1] : Patient is a 79 yo M CKD 5 now on HD via TDC, presenting for discussion of PD and HTN, uremic sx.  CKD 5 - on HD, continue as per center, discussed with nephrologist at center to arrange for PD catheter and training  HTN - at goal todfay, continue UF with HD, discussed may need to come off meds with continued UF with HD  Uremic sx - discussed in detail, will slowly imrpove with HD or PD  RTC PRN, will continue to follow with Nephrology at [facility]

## 2024-04-02 NOTE — HISTORY OF PRESENT ILLNESS
[FreeTextEntry1] : Patient is a 77 yo M CKD 5 now on HD via TDC, presenting for discussion of PD and HTN, uremic sx. Overall feeling better tolerating well, no particular issues, energy not improving yet but appetite is improving slowly.  [facility], doing well, R TDC No edema resolved with HD

## 2024-05-17 NOTE — ASSESSMENT
[FreeTextEntry1] : ================================================================ 1. 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 CAD, s/p silent IWMI (between 05/15/23 and 09/20/23):  - continue aggressive medical management  - continue ASA 81mg po daily  - continue DAPT with clopidogrel 75mg po qd  - he is scheduled for an invasive coronary artery angiogram at [facility] on 03/15/24 as part of the pre-transplant workup   3. HTN: BP at ACC/AHA 2017 guideline target: likely secondary to ESRD:   - continue Entresto 49/51mg po bid  - continue carvedilol 6.25mg po bid (as part of HF regimen)  4. Dyslipidemia: lipids at goal:  - continue atorvastatin 40mg po qd  - discussed with patient therapeutic lifestyle changes to improve lipid metabolism  5. Diabetes type II: A1c 6.6% (12/11/23):   - continue insulin  - discussed with patient therapeutic lifestyle changes to improve glucose metabolism  - follow up with endocrinologist, Talon Cox MD at [facility]  6. Non-Hodgkins lymphoma: spinal tumor, s/p surgical excision, marginal zone lymphoma s/p Rituxan x 4, "cured":  - follow up with oncologist, Sun Dye MD at [place]  7. Chronic systolic heart failure secondary to ischemic cardiomyopathy:  - continue carvedilol 6.25mg po bid  - continue Entresto 49/51mg po bid  - will follow with serial echocardiograms  8. ESRD, enrolled in [facility] transplant center: Cr 9.0, eGFR 5 (02/22/24):  - follow up with nephrologists, Lele Kennedy MD and Eloy Carvalho MD  - avoid nephrotoxic agents  - continue follow up with renal transplant center at [facility], may have anonymous, altruistic living donor transplant through [facility]  - he requires renal replacement therapy at this time and will have a temporary HD catheter placed by IR on Thursday 03/14/24  9. Carotid artery atherosclerosis: 01/18/20: sono: LICA 20-49%, SUNITA 1-19%, + thyroid cysts.  - will follow with serial carotid artery sonograms  10. Anemia, normocytic, severe: ? secondary to CKD: Hgb 10.8 (12/18/23):   - follow up with hematologist  11. Edema and volume overload: resolved:  - continue bumetanide 2mg po bid prn    I spent > 60 minutes of total time on this visit, including time spent face-to-face and non-face-to-face. During this time, I took a relevant history and examined the patient. I reviewed relevant portions of her medical record and formulated a differential diagnosis and plan. I explained the relevant cardiac diagnoses to the patient, as well as the work up and management plan. I answered all questions related to the patient's cardiac conditions.

## 2024-05-17 NOTE — PHYSICAL EXAM
[General Appearance - Well Developed] : well developed [Normal Appearance] : normal appearance [Well Groomed] : well groomed [General Appearance - Well Nourished] : well nourished [No Deformities] : no deformities [General Appearance - In No Acute Distress] : no acute distress [Normal Conjunctiva] : the conjunctiva exhibited no abnormalities [Eyelids - No Xanthelasma] : the eyelids demonstrated no xanthelasmas [Normal Oral Mucosa] : normal oral mucosa [No Oral Pallor] : no oral pallor [No Oral Cyanosis] : no oral cyanosis [Normal Jugular Venous V Waves Present] : normal jugular venous V waves present [Normal Jugular Venous A Waves Present] : normal jugular venous A waves present [No Jugular Venous Garcia A Waves] : no jugular venous garcia A waves [Respiration, Rhythm And Depth] : normal respiratory rhythm and effort [Exaggerated Use Of Accessory Muscles For Inspiration] : no accessory muscle use [Abdomen Soft] : soft [Abdomen Tenderness] : non-tender [Auscultation Breath Sounds / Voice Sounds] : lungs were clear to auscultation bilaterally [Abdomen Mass (___ Cm)] : no abdominal mass palpated [Gait - Sufficient For Exercise Testing] : the gait was sufficient for exercise testing [Abnormal Walk] : normal gait [Nail Clubbing] : no clubbing of the fingernails [Cyanosis, Localized] : no localized cyanosis [Petechial Hemorrhages (___cm)] : no petechial hemorrhages [Skin Color & Pigmentation] : normal skin color and pigmentation [] : no rash [No Venous Stasis] : no venous stasis [No Skin Ulcers] : no skin ulcer [Skin Lesions] : no skin lesions [Oriented To Time, Place, And Person] : oriented to person, place, and time [No Xanthoma] : no  xanthoma was observed [No Anxiety] : not feeling anxious [Mood] : the mood was normal [Affect] : the affect was normal [Normal Rate] : normal [Normal S1] : normal S1 [Normal S2] : normal S2 [No Murmur] : no murmurs heard [No Abnormalities] : the abdominal aorta was not enlarged and no bruit was heard [2+] : left 2+ [S3] : no S3 [S4] : no S4 [Right Carotid Bruit] : no bruit heard over the right carotid [Left Carotid Bruit] : no bruit heard over the left carotid [Right Femoral Bruit] : no bruit heard over the right femoral artery [Left Femoral Bruit] : no bruit heard over the left femoral artery

## 2024-05-17 NOTE — ADDENDUM
[FreeTextEntry1] : 02/09/24: Mr. Villalobos is scheduled for peritoneal dialysis catheter insertion with Dr. Talon Lewis and Papi Calderón.  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  The procedure is not high-risk.  He had a nuclear myocardial perfusion stress test on 09/20/23 which did not reveal inducible ischemia.  He may, therefore, proceed with surgery without cardiac contraindications. He may hold aspirin and clopidogrel for 5 days prior to the procedure and resume 1 day post procedure provided any bleeding has subsided.   05/17/24: Mr. Villalobos is scheduled for hernia repair on 05/30/24 with Glory Valera MD ([phone] fax).  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  The procedure is intermediate risk.  He had a nuclear myocardial perfusion stress test on 09/20/23 which did not reveal inducible ischemia.  He may, therefore, proceed with surgery without cardiac contraindications. He may hold aspirin and clopidogrel for 5 days prior to the procedure and resume 1 day post procedure provided any bleeding has subsided.

## 2024-05-17 NOTE — HISTORY OF PRESENT ILLNESS
[FreeTextEntry1] : Mr. 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 He had complaints of back pain and had an MRI that demonstrated a mass. 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 He is following with an oncologist, Sun Dye MD, at [facility]. He completed a 6-cycle course of chemotherapy (vincristine and Rituxan).  At the time of his routine follow up with his oncologist, his lab work revealed severe ERYN on CKD.  He was sent in and admitted to [facility] from 04/11/23 to 04/19/23 with ERYN on CKD (creatinine 6.1, K 6.0).  He was found to have severe right hydronephrosis.  On 04/13/23, he underwent IR placement of a nephrostomy tube which was repositioned on 04/18/23.  He was discharged on 04/19/23 and had an outpatient MR cholangiogram on 05/09/23 which ruled out a malignant stricture of the right ureter. He was readmitted to [facility] from 05/11/23 to 05/17/23 with pyelonephritis.  He underwent cystoscopy/uteroscopy on 05/16/23.  He is receiving feromoxytol infusions for anemia at [facility].  He is enrolled in the renal transplant center at [facility].   On 10/19/23, he had an echocardiogram at [facility] which revealed an EF of 31%, dilated LV, LVH, basal and mid inferior and inferolateral akinesis, moderately dilated LA, mild MR, mild TR, and PASP 47 mmHg.  The newly reduced EF and regional wall motion abnormalities would represent a silent IWMI at some point between the two echocardiograms, 05/15/23 and 09/20/23.  During that time, he was admitted to the hospital with urinary obstruction but does not recall any symptomatic cardiac events. We discussed timing his invasive coronary artery angiogram with his upcoming kidney transplant and trying to avoid PCI unless necessary until afterward.   We had an extensive discussion regarding his eminent need for renal replacement and the need to prepare the HD or peritoneal dialysis access. At present, his GARCIA has improved.  He informs me that a [employer] organization (? [employer]) was able to locate a suitable anonymous altruistic living donor kidney and he will have kidney transplant at [facility] in the near future (perhaps as soon as next week).   He will have a temporary HD catheter placed by interventional radiology at [facility] on Thursday 03/14/24 to optimize him for the transplant surgery and to facilitate coronary artery angiography at [facility] on Friday 03/15/24.

## 2024-06-10 PROBLEM — I50.22 CHRONIC SYSTOLIC HEART FAILURE: Status: ACTIVE | Noted: 2023-10-24

## 2024-06-10 PROBLEM — N18.5 CKD (CHRONIC KIDNEY DISEASE), STAGE V: Status: ACTIVE | Noted: 2023-06-08

## 2024-06-10 PROBLEM — Z86.79 HISTORY OF HYPERTENSION: Status: RESOLVED | Noted: 2023-06-29 | Resolved: 2024-06-10

## 2024-06-10 PROBLEM — I25.10 CAD (CORONARY ARTERY DISEASE): Status: ACTIVE | Noted: 2019-02-12

## 2024-06-10 PROBLEM — I77.9 CAROTID ARTERY DISEASE: Status: ACTIVE | Noted: 2019-06-20

## 2024-06-10 PROBLEM — R60.0 EDEMA, LOWER EXTREMITY: Status: ACTIVE | Noted: 2023-06-08

## 2024-06-10 PROBLEM — I45.6 WPW (WOLFF-PARKINSON-WHITE SYNDROME): Status: ACTIVE | Noted: 2019-02-12

## 2024-06-10 PROBLEM — I10 HTN (HYPERTENSION), BENIGN: Status: ACTIVE | Noted: 2019-02-12

## 2024-06-10 PROBLEM — E78.5 DYSLIPIDEMIA: Status: ACTIVE | Noted: 2019-02-04

## 2024-06-10 PROBLEM — E11.9 TYPE II DIABETES MELLITUS: Status: ACTIVE | Noted: 2019-02-12

## 2024-06-11 ENCOUNTER — NON-APPOINTMENT (OUTPATIENT)
Age: 79
End: 2024-06-11

## 2024-06-11 ENCOUNTER — APPOINTMENT (OUTPATIENT)
Dept: HEART AND VASCULAR | Facility: CLINIC | Age: 79
End: 2024-06-11
Payer: MEDICARE

## 2024-06-11 VITALS
HEART RATE: 61 BPM | SYSTOLIC BLOOD PRESSURE: 156 MMHG | HEIGHT: 70 IN | TEMPERATURE: 97.6 F | BODY MASS INDEX: 20.49 KG/M2 | DIASTOLIC BLOOD PRESSURE: 68 MMHG | RESPIRATION RATE: 100 BRPM | WEIGHT: 143.1 LBS

## 2024-06-11 VITALS — DIASTOLIC BLOOD PRESSURE: 76 MMHG | SYSTOLIC BLOOD PRESSURE: 147 MMHG

## 2024-06-11 DIAGNOSIS — I50.22 CHRONIC SYSTOLIC (CONGESTIVE) HEART FAILURE: ICD-10-CM

## 2024-06-11 DIAGNOSIS — Z86.79 PERSONAL HISTORY OF OTHER DISEASES OF THE CIRCULATORY SYSTEM: ICD-10-CM

## 2024-06-11 DIAGNOSIS — I77.9 DISORDER OF ARTERIES AND ARTERIOLES, UNSPECIFIED: ICD-10-CM

## 2024-06-11 DIAGNOSIS — I25.10 ATHEROSCLEROTIC HEART DISEASE OF NATIVE CORONARY ARTERY W/OUT ANGINA PECTORIS: ICD-10-CM

## 2024-06-11 DIAGNOSIS — I45.6 PRE-EXCITATION SYNDROME: ICD-10-CM

## 2024-06-11 DIAGNOSIS — N18.5 CHRONIC KIDNEY DISEASE, STAGE 5: ICD-10-CM

## 2024-06-11 DIAGNOSIS — I10 ESSENTIAL (PRIMARY) HYPERTENSION: ICD-10-CM

## 2024-06-11 DIAGNOSIS — E78.5 HYPERLIPIDEMIA, UNSPECIFIED: ICD-10-CM

## 2024-06-11 DIAGNOSIS — E11.9 TYPE 2 DIABETES MELLITUS W/OUT COMPLICATIONS: ICD-10-CM

## 2024-06-11 DIAGNOSIS — R60.0 LOCALIZED EDEMA: ICD-10-CM

## 2024-06-11 PROCEDURE — G2211 COMPLEX E/M VISIT ADD ON: CPT

## 2024-06-11 PROCEDURE — 99215 OFFICE O/P EST HI 40 MIN: CPT

## 2024-06-11 PROCEDURE — 93000 ELECTROCARDIOGRAM COMPLETE: CPT

## 2024-06-11 RX ORDER — CLOPIDOGREL BISULFATE 75 MG/1
75 TABLET, FILM COATED ORAL DAILY
Qty: 90 | Refills: 3 | Status: DISCONTINUED | COMMUNITY
Start: 2023-10-26 | End: 2024-06-11

## 2024-06-11 NOTE — REASON FOR VISIT
[FreeTextEntry1] : ============================================================= Diagnostic Tests: -------------------------------------------------------------- EC24: sinus rhythm, WPW pattern, single PVC.  23: sinus rhythm, WPW pattern, single PVC.  23: sinus rhythm, WPW pattern. 22: sinus rhythm, WPW pattern. 20: sinus rhythm, WPW pattern. 10/22/19: sinus rhythm, WPW pattern, PVC.  19: sinus rhythm, WPW pattern.   18: sinus bradycardia, WPW pattern. 10/07/15: sinus bradycardia, WPW pattern. 14: NSR, LAD, WPW pattern 13: NSR, LAD, WPW pattern 10/04/11: sinus bradycardia, WPW pattern.  -------------------------------------------------------------- Echo:  10/19/23: EF 31%, dilated LV, LVH, basal and mid inferior and inferolateral akinesis, moderately dilated LA, mild MR, mild TR, PASP 47 mmHg.  05/15/23: EF 55%, mild LVH, grade I diastolic dysfunction, mildly dilated LA, aortic sclerosis, mild-to-moderate MR, mild TR, PASP 41 mmHg.   22: EF 63%, mild MR.  20: EF 60%, mild MR.  19: EF 58%, moderately dilated LA, moderate MR, trace AI, mild TR. 18: EF 66%, mild MR, moderate TR, GLS -19.8%.  18: EF 61%, trace MR/TR.  09: EF 59%, dilated LA, mild MR/TR -------------------------------------------------------------- Stress:  23: exercise MPI: 8 minutes of Enoc, large fixed inferior defect, stress EF 37%.  08: exercise MPI: 13 METS, EF 54%, partially reversible inferior wall defect -------------------------------------------------------------- Cath:  24: at [facility]: moderate diffuse atherosclerosis.  08: RCA prox 50%, LAD mid 50%, D1 ostium 50%, LCX mid 60%, OM2 40%, EF 60%. -------------------------------------------------------------- Carotid: 20: sono: b/l ICA 1-19% stenosis.  19: sono: LICA 20-49%, SUNITA 1-19%, + thyroid cysts.

## 2024-06-11 NOTE — ASSESSMENT
[FreeTextEntry1] : ================================================================ 1. 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 CAD, s/p silent IWMI (between 05/15/23 and 09/20/23): s/p LHC 03/20/24 with moderate diffuse CAD:  - continue aggressive medical management  - continue ASA 81mg po daily  - discontinue clopidogrel 75mg po qd  - will follow LV function with serial echocardiograms (ordered today)  3. HTN: BP at ACC/AHA 2017 guideline target: likely secondary to ESRD:   - continue Entresto 49/51mg po bid  - continue carvedilol 6.25mg po bid (as part of HF regimen)  4. Dyslipidemia: lipids at goal:  - continue atorvastatin 40mg po qd  - discussed with patient therapeutic lifestyle changes to improve lipid metabolism  5. Diabetes type II: A1c 6.6% (12/11/23):   - continue insulin  - discussed with patient therapeutic lifestyle changes to improve glucose metabolism  - follow up with endocrinologist, Talon Cox MD at [facility]  6. Non-Hodgkins lymphoma: spinal tumor, s/p surgical excision, marginal zone lymphoma s/p Rituxan x 4, "cured":  - follow up with oncologist, Sun Dye MD at [place]  7. Chronic systolic heart failure secondary to ischemic cardiomyopathy:  - continue carvedilol 6.25mg po bid  - continue Entresto 49/51mg po bid  - will follow with serial echocardiograms  8. ESRD, enrolled in [facility] transplant center:  Cr. 6.21 (06/03/24):   - follow up with nephrologists, Lele Kennedy MD  - avoid nephrotoxic agents  - continue follow up with renal transplant center at [facility], may have anonymous, altruistic living donor transplant through [employer]  - continue hemodialysis pending transition to peritoneal dialysis  9. Carotid artery atherosclerosis: 01/18/20: sono: LICA 20-49%, SUNITA 1-19%, + thyroid cysts.  - will follow with serial carotid artery sonograms  10. Anemia, normocytic, severe: ? secondary to CKD: Hgb 10.4 (06/03/24):  - follow up with hematologist  11. Edema and volume overload: resolved:  - continue bumetanide 2mg po bid prn    I spent > 45 minutes of total time on this visit, including time spent face-to-face and non-face-to-face. During this time, I took a relevant history and examined the patient. I reviewed relevant portions of her medical record and formulated a differential diagnosis and plan. I explained the relevant cardiac diagnoses to the patient, as well as the work up and management plan. I answered all questions related to the patient's cardiac conditions.

## 2024-06-11 NOTE — HISTORY OF PRESENT ILLNESS
[FreeTextEntry1] : Mr. 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 He had complaints of back pain and had an MRI that demonstrated a mass. 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 He is following with an oncologist, Sun Dye MD, at [facility]. He completed a 6-cycle course of chemotherapy (vincristine and Rituxan).  At the time of his routine follow up with his oncologist, his lab work revealed severe ERYN on CKD.  He was sent in and admitted to [facility] from 04/11/23 to 04/19/23 with ERYN on CKD (creatinine 6.1, K 6.0).  He was found to have severe right hydronephrosis.  On 04/13/23, he underwent IR placement of a nephrostomy tube which was repositioned on 04/18/23.  He was discharged on 04/19/23 and had an outpatient MR cholangiogram on 05/09/23 which ruled out a malignant stricture of the right ureter. He was readmitted to [facility] from 05/11/23 to 05/17/23 with pyelonephritis.  He underwent cystoscopy/uteroscopy on 05/16/23.  He is receiving feromoxytol infusions for anemia at [facility].  He is enrolled in the renal transplant center at [facility].   On 10/19/23, he had an echocardiogram at [facility] which revealed an EF of 31%, dilated LV, LVH, basal and mid inferior and inferolateral akinesis, moderately dilated LA, mild MR, mild TR, and PASP 47 mmHg.  The newly reduced EF and regional wall motion abnormalities would represent a silent IWMI at some point between the two echocardiograms, 05/15/23 and 09/20/23.  During that time, he was admitted to the hospital with urinary obstruction but does not recall any symptomatic cardiac events. We discussed timing his invasive coronary artery angiogram with his upcoming kidney transplant and trying to avoid PCI unless necessary until afterward.   We had an extensive discussion regarding his eminent need for renal replacement and the need to prepare the HD or peritoneal dialysis access. At present, his GARCIA has improved.  He informs me that a [employer] organization (? [employer]) was able to locate a suitable anonymous altruistic living donor kidney and he will have kidney transplant at [facility] in the near future (perhaps as soon as next week).   He he had a temporary HD catheter placed by interventional radiology at [facility] on Thursday 03/14/24.  On 03/20/24, he had an invasive coronary artery angiogram at [facility] which revealed moderate diffuse atherosclerosis. He will soon transition to peritoneal dialysis.

## 2024-07-23 ENCOUNTER — NON-APPOINTMENT (OUTPATIENT)
Age: 79
End: 2024-07-23

## 2024-07-23 NOTE — HISTORY OF PRESENT ILLNESS
[FreeTextEntry1] : Mr. 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 He had complaints of back pain and had an MRI that demonstrated a mass. 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 He is following with an oncologist, Sun Dye MD, at [facility]. He completed a 6-cycle course of chemotherapy (vincristine and Rituxan).  At the time of his routine follow up with his oncologist, his lab work revealed severe ERYN on CKD.  He was sent in and admitted to [facility] from 04/11/23 to 04/19/23 with ERYN on CKD (creatinine 6.1, K 6.0).  He was found to have severe right hydronephrosis.  On 04/13/23, he underwent IR placement of a nephrostomy tube which was repositioned on 04/18/23.  He was discharged on 04/19/23 and had an outpatient MR cholangiogram on 05/09/23 which ruled out a malignant stricture of the right ureter. He was readmitted to [facility] from 05/11/23 to 05/17/23 with pyelonephritis.  He underwent cystoscopy/uteroscopy on 05/16/23.  He is receiving feromoxytol infusions for anemia at [facility].  He is enrolled in the renal transplant center at [facility].   On 10/19/23, he had an echocardiogram at [facility] which revealed an EF of 31%, dilated LV, LVH, basal and mid inferior and inferolateral akinesis, moderately dilated LA, mild MR, mild TR, and PASP 47 mmHg.  The newly reduced EF and regional wall motion abnormalities would represent a silent IWMI at some point between the two echocardiograms, 05/15/23 and 09/20/23.  During that time, he was admitted to the hospital with urinary obstruction but does not recall any symptomatic cardiac events. We discussed timing his invasive coronary artery angiogram with his upcoming kidney transplant and trying to avoid PCI unless necessary until afterward.   We had an extensive discussion regarding his eminent need for renal replacement and the need to prepare the HD or peritoneal dialysis access. At present, his GARCIA has improved.  He informs me that a [employer] organization (? [employer]) was able to locate a suitable anonymous altruistic living donor kidney and he will have kidney transplant at [facility] in the near future (perhaps as soon as next week).   He he had a temporary HD catheter placed by interventional radiology at [facility] on Thursday 03/14/24.  On 03/20/24, he had an invasive coronary artery angiogram at [facility] which revealed moderate diffuse atherosclerosis. He will soon transition to peritoneal dialysis.

## 2024-07-23 NOTE — REASON FOR VISIT
[Other: ____] : [unfilled] [FreeTextEntry1] : ============================================================= Diagnostic Tests: -------------------------------------------------------------- EC24: sinus rhythm, WPW pattern, single PVC.  23: sinus rhythm, WPW pattern, single PVC.  23: sinus rhythm, WPW pattern. 22: sinus rhythm, WPW pattern. 20: sinus rhythm, WPW pattern. 10/22/19: sinus rhythm, WPW pattern, PVC.  19: sinus rhythm, WPW pattern.   18: sinus bradycardia, WPW pattern. 10/07/15: sinus bradycardia, WPW pattern. 14: NSR, LAD, WPW pattern 13: NSR, LAD, WPW pattern 10/04/11: sinus bradycardia, WPW pattern.  -------------------------------------------------------------- Echo:  24: EF 43%, LVH, mildly dilated LA, aortic sclerosis, moderate-to-severe MR, small-to-medium sized pericardial effusion.  10/19/23: EF 31%, dilated LV, LVH, basal and mid inferior and inferolateral akinesis, moderately dilated LA, mild MR, mild TR, PASP 47 mmHg.  05/15/23: EF 55%, mild LVH, grade I diastolic dysfunction, mildly dilated LA, aortic sclerosis, mild-to-moderate MR, mild TR, PASP 41 mmHg.   22: EF 63%, mild MR.  20: EF 60%, mild MR.  19: EF 58%, moderately dilated LA, moderate MR, trace AI, mild TR. 18: EF 66%, mild MR, moderate TR, GLS -19.8%.  18: EF 61%, trace MR/TR.  09: EF 59%, dilated LA, mild MR/TR -------------------------------------------------------------- Stress:  23: exercise MPI: 8 minutes of Enoc, large fixed inferior defect, stress EF 37%.  08: exercise MPI: 13 METS, EF 54%, partially reversible inferior wall defect -------------------------------------------------------------- Cath:  24: at [facility]: moderate diffuse atherosclerosis.  08: RCA prox 50%, LAD mid 50%, D1 ostium 50%, LCX mid 60%, OM2 40%, EF 60%. -------------------------------------------------------------- Carotid: 20: sono: b/l ICA 1-19% stenosis.  19: sono: LICA 20-49%, SUNITA 1-19%, + thyroid cysts.

## 2024-07-23 NOTE — PHYSICAL EXAM
[General Appearance - Well Developed] : well developed [Normal Appearance] : normal appearance [Well Groomed] : well groomed [General Appearance - Well Nourished] : well nourished [No Deformities] : no deformities [General Appearance - In No Acute Distress] : no acute distress [Normal Conjunctiva] : the conjunctiva exhibited no abnormalities [Eyelids - No Xanthelasma] : the eyelids demonstrated no xanthelasmas [Normal Oral Mucosa] : normal oral mucosa [No Oral Pallor] : no oral pallor [No Oral Cyanosis] : no oral cyanosis [Normal Jugular Venous A Waves Present] : normal jugular venous A waves present [Normal Jugular Venous V Waves Present] : normal jugular venous V waves present [No Jugular Venous Garcia A Waves] : no jugular venous garcia A waves [Respiration, Rhythm And Depth] : normal respiratory rhythm and effort [Exaggerated Use Of Accessory Muscles For Inspiration] : no accessory muscle use [Auscultation Breath Sounds / Voice Sounds] : lungs were clear to auscultation bilaterally [Abdomen Soft] : soft [Abdomen Tenderness] : non-tender [Abdomen Mass (___ Cm)] : no abdominal mass palpated [FreeTextEntry1] : + PD catheter [Abnormal Walk] : normal gait [Gait - Sufficient For Exercise Testing] : the gait was sufficient for exercise testing [Nail Clubbing] : no clubbing of the fingernails [Cyanosis, Localized] : no localized cyanosis [Petechial Hemorrhages (___cm)] : no petechial hemorrhages [Skin Color & Pigmentation] : normal skin color and pigmentation [] : no rash [No Venous Stasis] : no venous stasis [Skin Lesions] : no skin lesions [No Skin Ulcers] : no skin ulcer [No Xanthoma] : no  xanthoma was observed [Oriented To Time, Place, And Person] : oriented to person, place, and time [Affect] : the affect was normal [Mood] : the mood was normal [No Anxiety] : not feeling anxious [Normal Rate] : normal [Normal S1] : normal S1 [Normal S2] : normal S2 [S3] : no S3 [S4] : no S4 [No Murmur] : no murmurs heard [Right Carotid Bruit] : no bruit heard over the right carotid [Left Carotid Bruit] : no bruit heard over the left carotid [Right Femoral Bruit] : no bruit heard over the right femoral artery [Left Femoral Bruit] : no bruit heard over the left femoral artery [2+] : left 2+ [No Abnormalities] : the abdominal aorta was not enlarged and no bruit was heard

## 2024-07-23 NOTE — ASSESSMENT
[FreeTextEntry1] : ================================================================ 1. 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 CAD, s/p silent IWMI (between 05/15/23 and 09/20/23): s/p LHC 03/20/24 with moderate diffuse CAD:  - continue aggressive medical management  - continue ASA 81mg po daily  - discontinue clopidogrel 75mg po qd  - will follow LV function with serial echocardiograms (ordered today)  3. HTN: BP at ACC/AHA 2017 guideline target: likely secondary to ESRD:   - continue Entresto 49/51mg po bid  - continue carvedilol 6.25mg po bid (as part of HF regimen)  4. Dyslipidemia: lipids at goal:  - continue atorvastatin 40mg po qd  - discussed with patient therapeutic lifestyle changes to improve lipid metabolism  5. Diabetes type II: A1c 6.6% (12/11/23):   - continue insulin  - discussed with patient therapeutic lifestyle changes to improve glucose metabolism  - follow up with endocrinologist, Talon Cox [place] [facility]  6. Non-Hodgkins lymphoma: spinal tumor, s/p surgical excision, marginal zone lymphoma s/p Rituxan x 4, "cured":  - follow up with oncologist, Sun Dye MD at [place]  7. Chronic systolic heart failure secondary to ischemic cardiomyopathy:  - continue carvedilol 6.25mg po bid  - continue Entresto 49/51mg po bid  - will follow with serial echocardiograms  8. ESRD, enrolled in [facility] transplant center:  Cr. 6.21 (06/03/24):   - follow up with nephrologists, Lele Kennedy MD  - avoid nephrotoxic agents  - continue follow up with renal transplant center at [facility], may have anonymous, altruistic living donor transplant through [employer]  - continue hemodialysis pending transition to peritoneal dialysis  9. Carotid artery atherosclerosis: 01/18/20: sono: LICA 20-49%, SUNITA 1-19%, + thyroid cysts.  - will follow with serial carotid artery sonograms  10. Anemia, normocytic, severe: ? secondary to CKD: Hgb 10.4 (06/03/24):  - follow up with hematologist  11. Edema and volume overload: resolved:  - continue bumetanide 2mg po bid prn    I spent > 45 minutes of total time on this visit, including time spent face-to-face and non-face-to-face. During this time, I took a relevant history and examined the patient. I reviewed relevant portions of her medical record and formulated a differential diagnosis and plan. I explained the relevant cardiac diagnoses to the patient, as well as the work up and management plan. I answered all questions related to the patient's cardiac conditions.

## 2024-09-30 ENCOUNTER — RX RENEWAL (OUTPATIENT)
Age: 79
End: 2024-09-30

## 2024-10-01 ENCOUNTER — APPOINTMENT (OUTPATIENT)
Dept: HEART AND VASCULAR | Facility: CLINIC | Age: 79
End: 2024-10-01
Payer: MEDICARE

## 2024-10-01 VITALS
BODY MASS INDEX: 22.05 KG/M2 | HEIGHT: 70 IN | SYSTOLIC BLOOD PRESSURE: 120 MMHG | DIASTOLIC BLOOD PRESSURE: 65 MMHG | OXYGEN SATURATION: 99 % | WEIGHT: 154 LBS | HEART RATE: 64 BPM | TEMPERATURE: 97.7 F

## 2024-10-01 DIAGNOSIS — I50.22 CHRONIC SYSTOLIC (CONGESTIVE) HEART FAILURE: ICD-10-CM

## 2024-10-01 DIAGNOSIS — E11.9 TYPE 2 DIABETES MELLITUS W/OUT COMPLICATIONS: ICD-10-CM

## 2024-10-01 DIAGNOSIS — E78.5 HYPERLIPIDEMIA, UNSPECIFIED: ICD-10-CM

## 2024-10-01 DIAGNOSIS — N18.5 CHRONIC KIDNEY DISEASE, STAGE 5: ICD-10-CM

## 2024-10-01 DIAGNOSIS — R60.0 LOCALIZED EDEMA: ICD-10-CM

## 2024-10-01 DIAGNOSIS — I10 ESSENTIAL (PRIMARY) HYPERTENSION: ICD-10-CM

## 2024-10-01 DIAGNOSIS — I25.10 ATHEROSCLEROTIC HEART DISEASE OF NATIVE CORONARY ARTERY W/OUT ANGINA PECTORIS: ICD-10-CM

## 2024-10-01 DIAGNOSIS — I45.6 PRE-EXCITATION SYNDROME: ICD-10-CM

## 2024-10-01 DIAGNOSIS — I77.9 DISORDER OF ARTERIES AND ARTERIOLES, UNSPECIFIED: ICD-10-CM

## 2024-10-01 PROCEDURE — G2211 COMPLEX E/M VISIT ADD ON: CPT

## 2024-10-01 PROCEDURE — 99215 OFFICE O/P EST HI 40 MIN: CPT

## 2024-10-01 RX ORDER — TIRZEPATIDE 7.5 MG/.5ML
7.5 INJECTION, SOLUTION SUBCUTANEOUS
Refills: 0 | Status: ACTIVE | COMMUNITY
Start: 2024-10-01

## 2024-10-01 NOTE — PHYSICAL EXAM
[FreeTextEntry1] : + PD catheter [S3] : no S3 [S4] : no S4 [Right Carotid Bruit] : no bruit heard over the right carotid [Left Carotid Bruit] : no bruit heard over the left carotid [Right Femoral Bruit] : no bruit heard over the right femoral artery [Left Femoral Bruit] : no bruit heard over the left femoral artery

## 2024-10-01 NOTE — HISTORY OF PRESENT ILLNESS
[FreeTextEntry1] : Mr. 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 He had complaints of back pain and had an MRI that demonstrated a mass. 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 He is following with an oncologist, Sun Dye MD, at [facility]. He completed a 6-cycle course of chemotherapy (vincristine and Rituxan).  At the time of his routine follow up with his oncologist, his lab work revealed severe ERYN on CKD.  He was sent in and [place] [facility] [place] 04/19/23 with ERYN on CKD (creatinine 6.1, K 6.0).  He was found to have severe right hydronephrosis.  On 04/13/23, he underwent IR placement of a nephrostomy tube which was repositioned on 04/18/23.  He was discharged on 04/19/23 and had an outpatient MR cholangiogram on 05/09/23 which ruled out a malignant stricture of the right ureter. He was readmitted to [facility] from 05/11/23 to 05/17/23 with pyelonephritis.  He underwent cystoscopy/uteroscopy on 05/16/23.  He is receiving feromoxytol infusions for anemia [place] [facility].  He is enrolled in the renal transplant center at [facility].   On 10/19/23, he had an echocardiogram at [facility] which revealed an EF of 31%, dilated LV, LVH, basal and mid inferior and inferolateral akinesis, moderately dilated LA, mild MR, mild TR, and PASP 47 mmHg.  The newly reduced EF and regional wall motion abnormalities would represent a silent IWMI at some point between the two echocardiograms, 05/15/23 and 09/20/23.  During that time, he was admitted to the hospital with urinary obstruction but does not recall any symptomatic cardiac events. We discussed timing his invasive coronary artery angiogram with his upcoming kidney transplant and trying to avoid PCI unless necessary until afterward.   We had an extensive discussion regarding his eminent need for renal replacement and the need to prepare the HD or peritoneal dialysis access. At present, his GARCIA has improved.  He informs me that a [employer] organization (? [employer]) was able to locate a suitable anonymous altruistic living donor kidney and he will have kidney transplant at [facility] in the near future (perhaps as soon as next week).   He he had a temporary HD catheter placed by interventional radiology at [facility] on 03/14/24.  On 03/20/24, he had an invasive coronary artery angiogram at [facility] which revealed moderate diffuse atherosclerosis. He is now on peritoneal dialysis.

## 2024-10-01 NOTE — ASSESSMENT
[FreeTextEntry1] : ================================================================ 1. 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 CAD, s/p silent IWMI (between 05/15/23 and 09/20/23): s/p C 03/20/24 with moderate diffuse CAD:  - continue aggressive medical management  - continue single antiplatelet therapy with ASA 81mg po daily  - will follow LV function with serial echocardiograms   3. HTN: BP at ACC/AHA 2017 guideline target: likely secondary to ESRD:   - continue Entresto 49/51mg po bid  - continue carvedilol 6.25mg po bid (as part of HF regimen)  4. Dyslipidemia: LDL 77 (07/01/24):   - continue atorvastatin 40mg po qd  - discussed with patient therapeutic lifestyle changes to improve lipid metabolism  5. Diabetes type II: A1c 5.9% (07/01/24):   - continue insulin  - continue Mounjaro 7.5mg sc q week   - discussed with patient therapeutic lifestyle changes to improve glucose metabolism  - follow up with endocrinologist, Talon Cox MD at [facility] [place]  6. Non-Hodgkins lymphoma: spinal tumor, s/p surgical excision, marginal zone lymphoma s/p Rituxan x 4, "cured":  - follow up with oncologist, Sun Dye MD at [place]  7. Chronic systolic heart failure secondary to ischemic cardiomyopathy:  - continue carvedilol 6.25mg po bid  - continue Entresto 49/51mg po bid  - will follow with serial echocardiograms  8. ESRD, enrolled in [facility] transplant center:  Cr. 6.21 (06/03/24):   - follow up with nephrologists, Lele Kennedy MD  - avoid nephrotoxic agents  - continue follow up with renal transplant center at [facility], may have anonymous, altruistic living donor transplant through [employer]  - continue peritoneal dialysis   9. Carotid artery atherosclerosis: 01/18/20: sono: LICA 20-49%, SUNITA 1-19%, + thyroid cysts.  - will follow with serial carotid artery sonograms     I spent > 45 minutes of total time on this visit, including time spent face-to-face and non-face-to-face. During this time, I took a relevant history and examined the patient. I reviewed relevant portions of her medical record and formulated a differential diagnosis and plan. I explained the relevant cardiac diagnoses to the patient, as well as the work up and management plan. I answered all questions related to the patient's cardiac conditions.

## 2024-10-01 NOTE — REASON FOR VISIT
[FreeTextEntry1] : ============================================================= Diagnostic Tests: -------------------------------------------------------------- EC24: sinus rhythm, WPW pattern, single PVC.  23: sinus rhythm, WPW pattern, single PVC.  23: sinus rhythm, WPW pattern. 22: sinus rhythm, WPW pattern. 20: sinus rhythm, WPW pattern. 10/22/19: sinus rhythm, WPW pattern, PVC.  19: sinus rhythm, WPW pattern.   18: sinus bradycardia, WPW pattern. 10/07/15: sinus bradycardia, WPW pattern. 14: NSR, LAD, WPW pattern 13: NSR, LAD, WPW pattern 10/04/11: sinus bradycardia, WPW pattern.  -------------------------------------------------------------- Echo:  24: EF 43%, LVH, mildly dilated LA, aortic sclerosis, moderate-to-severe MR, small-to-medium sized pericardial effusion.  10/19/23: EF 31%, dilated LV, LVH, basal and mid inferior and inferolateral akinesis, moderately dilated LA, mild MR, mild TR, PASP 47 mmHg.  05/15/23: EF 55%, mild LVH, grade I diastolic dysfunction, mildly dilated LA, aortic sclerosis, mild-to-moderate MR, mild TR, PASP 41 mmHg.   22: EF 63%, mild MR.  20: EF 60%, mild MR.  19: EF 58%, moderately dilated LA, moderate MR, trace AI, mild TR. 18: EF 66%, mild MR, moderate TR, GLS -19.8%.  18: EF 61%, trace MR/TR.  09: EF 59%, dilated LA, mild MR/TR -------------------------------------------------------------- Stress:  23: exercise MPI: 8 minutes of Enoc, large fixed inferior defect, stress EF 37%.  08: exercise MPI: 13 METS, EF 54%, partially reversible inferior wall defect -------------------------------------------------------------- Cath:  24: at [facility]: moderate diffuse atherosclerosis.  08: RCA prox 50%, LAD mid 50%, D1 ostium 50%, LCX mid 60%, OM2 40%, EF 60%. -------------------------------------------------------------- Carotid: 20: sono: b/l ICA 1-19% stenosis.  19: sono: LICA 20-49%, SUNITA 1-19%, + thyroid cysts.

## 2024-10-01 NOTE — HISTORY OF PRESENT ILLNESS
[FreeTextEntry1] : Mr. 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 He had complaints of back pain and had an MRI that demonstrated a mass. 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 He is following with an oncologist, Sun Dye MD, at [facility]. He completed a 6-cycle course of chemotherapy (vincristine and Rituxan).  At the time of his routine follow up with his oncologist, his lab work revealed severe ERYN on CKD.  He was sent in and admitted to [facility] from 04/11/23 to 04/19/23 with ERYN on CKD (creatinine 6.1, K 6.0).  He was found to have severe right hydronephrosis.  On 04/13/23, he underwent IR placement of a nephrostomy tube which was repositioned on 04/18/23.  He was discharged on 04/19/23 and had an outpatient MR cholangiogram on 05/09/23 which ruled out a malignant stricture of the right ureter. He was readmitted to [facility] from 05/11/23 to 05/17/23 with pyelonephritis.  He underwent cystoscopy/uteroscopy on 05/16/23.  He is receiving feromoxytol infusions for anemia at [facility].  He is enrolled in the renal transplant center at [facility].   On 10/19/23, he had an echocardiogram at [facility] which revealed an EF of 31%, dilated LV, LVH, basal and mid inferior and inferolateral akinesis, moderately dilated LA, mild MR, mild TR, and PASP 47 mmHg.  The newly reduced EF and regional wall motion abnormalities would represent a silent IWMI at some point between the two echocardiograms, 05/15/23 and 09/20/23.  During that time, he was admitted to the hospital with urinary obstruction but does not recall any symptomatic cardiac events. We discussed timing his invasive coronary artery angiogram with his upcoming kidney transplant and trying to avoid PCI unless necessary until afterward.   We had an extensive discussion regarding his eminent need for renal replacement and the need to prepare the HD or peritoneal dialysis access. At present, his GARCIA has improved.  He informs me that a [employer] organization (? [employer]) was able to locate a suitable anonymous altruistic living donor kidney and he will have kidney transplant at [facility] in the near future (perhaps as soon as next week).   He he had a temporary HD catheter placed by interventional radiology at [facility] on 03/14/24.  On 03/20/24, he had an invasive coronary artery angiogram at [facility] which revealed moderate diffuse atherosclerosis. He is now on peritoneal dialysis.

## 2024-10-01 NOTE — ASSESSMENT
[FreeTextEntry1] : ================================================================ 1. 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 CAD, s/p silent IWMI (between 05/15/23 and 09/20/23): s/p C 03/20/24 with moderate diffuse CAD:  - continue aggressive medical management  - continue single antiplatelet therapy with ASA 81mg po daily  - will follow LV function with serial echocardiograms   3. HTN: BP at ACC/AHA 2017 guideline target: likely secondary to ESRD:   - continue Entresto 49/51mg po bid  - continue carvedilol 6.25mg po bid (as part of HF regimen)  4. Dyslipidemia: LDL 77 (07/01/24):   - continue atorvastatin 40mg po qd  - discussed with patient therapeutic lifestyle changes to improve lipid metabolism  5. Diabetes type II: A1c 5.9% (07/01/24):   - continue insulin  - continue Mounjaro 7.5mg sc q week   - discussed with patient therapeutic lifestyle changes to improve glucose metabolism  - follow up with endocrinologist, Talon Cox MD at [facility] [place]  6. Non-Hodgkins lymphoma: spinal tumor, s/p surgical excision, marginal zone lymphoma s/p Rituxan x 4, "cured":  - follow up with oncologist, Sun Dye MD at [facility]  7. Chronic systolic heart failure secondary to ischemic cardiomyopathy:  - continue carvedilol 6.25mg po bid  - continue Entresto 49/51mg po bid  - will follow with serial echocardiograms  8. ESRD, enrolled in [facility] transplant center:  Cr. 6.21 (06/03/24):   - follow up with nephrologists, Lele Kennedy MD  - avoid nephrotoxic agents  - continue follow up with renal transplant center at [facility], may have anonymous, altruistic living donor transplant through [employer]  - continue peritoneal dialysis   9. Carotid artery atherosclerosis: 01/18/20: sono: LICA 20-49%, SUNITA 1-19%, + thyroid cysts.  - will follow with serial carotid artery sonograms     I spent > 45 minutes of total time on this visit, including time spent face-to-face and non-face-to-face. During this time, I took a relevant history and examined the patient. I reviewed relevant portions of her medical record and formulated a differential diagnosis and plan. I explained the relevant cardiac diagnoses to the patient, as well as the work up and management plan. I answered all questions related to the patient's cardiac conditions.

## 2024-10-01 NOTE — REASON FOR VISIT
[FreeTextEntry1] : ============================================================= Diagnostic Tests: -------------------------------------------------------------- EC24: sinus rhythm, WPW pattern, single PVC.  23: sinus rhythm, WPW pattern, single PVC.  23: sinus rhythm, WPW pattern. 22: sinus rhythm, WPW pattern. 20: sinus rhythm, WPW pattern. 10/22/19: sinus rhythm, WPW pattern, PVC.  19: sinus rhythm, WPW pattern.   18: sinus bradycardia, WPW pattern. 10/07/15: sinus bradycardia, WPW pattern. 14: NSR, LAD, WPW pattern 13: NSR, LAD, WPW pattern 10/04/11: sinus bradycardia, WPW pattern.  -------------------------------------------------------------- Echo:  24: EF 43%, LVH, mildly dilated LA, aortic sclerosis, moderate-to-severe MR, small-to-medium sized pericardial effusion.  10/19/23: EF 31%, dilated LV, LVH, basal and mid inferior and inferolateral akinesis, moderately dilated LA, mild MR, mild TR, PASP 47 mmHg.  05/15/23: EF 55%, mild LVH, grade I diastolic dysfunction, mildly dilated LA, aortic sclerosis, mild-to-moderate MR, mild TR, PASP 41 mmHg.   22: EF 63%, mild MR.  20: EF 60%, mild MR.  19: EF 58%, moderately dilated LA, moderate MR, trace AI, mild TR. 18: EF 66%, mild MR, moderate TR, GLS -19.8%.  18: EF 61%, trace MR/TR.  09: EF 59%, dilated LA, mild MR/TR -------------------------------------------------------------- Stress:  23: exercise MPI: 8 minutes of Enoc, large fixed inferior defect, stress EF 37%.  08: exercise MPI: 13 METS, EF 54%, partially reversible inferior wall defect -------------------------------------------------------------- Cath:  24: at [facility]: moderate diffuse atherosclerosis.  08: RCA prox 50%, LAD mid 50%, D1 ostium 50%, LCX mid 60%, OM2 40%, EF 60%. -------------------------------------------------------------- Carotid: 20: sono: b/l ICA 1-19% stenosis.  19: sono: LICA 20-49%, SUNITA 1-19%, + thyroid cysts.

## 2024-12-11 ENCOUNTER — NON-APPOINTMENT (OUTPATIENT)
Age: 79
End: 2024-12-11

## 2025-01-14 ENCOUNTER — APPOINTMENT (OUTPATIENT)
Dept: HEART AND VASCULAR | Facility: CLINIC | Age: 80
End: 2025-01-14
Payer: MEDICARE

## 2025-01-14 VITALS
HEART RATE: 59 BPM | OXYGEN SATURATION: 100 % | WEIGHT: 146 LBS | HEIGHT: 70 IN | BODY MASS INDEX: 20.9 KG/M2 | SYSTOLIC BLOOD PRESSURE: 165 MMHG | DIASTOLIC BLOOD PRESSURE: 72 MMHG | TEMPERATURE: 97.2 F

## 2025-01-14 VITALS — DIASTOLIC BLOOD PRESSURE: 72 MMHG | SYSTOLIC BLOOD PRESSURE: 142 MMHG

## 2025-01-14 DIAGNOSIS — I50.22 CHRONIC SYSTOLIC (CONGESTIVE) HEART FAILURE: ICD-10-CM

## 2025-01-14 DIAGNOSIS — E78.5 HYPERLIPIDEMIA, UNSPECIFIED: ICD-10-CM

## 2025-01-14 DIAGNOSIS — R60.0 LOCALIZED EDEMA: ICD-10-CM

## 2025-01-14 DIAGNOSIS — E11.9 TYPE 2 DIABETES MELLITUS W/OUT COMPLICATIONS: ICD-10-CM

## 2025-01-14 DIAGNOSIS — I45.6 PRE-EXCITATION SYNDROME: ICD-10-CM

## 2025-01-14 DIAGNOSIS — I10 ESSENTIAL (PRIMARY) HYPERTENSION: ICD-10-CM

## 2025-01-14 DIAGNOSIS — I77.9 DISORDER OF ARTERIES AND ARTERIOLES, UNSPECIFIED: ICD-10-CM

## 2025-01-14 DIAGNOSIS — N18.5 CHRONIC KIDNEY DISEASE, STAGE 5: ICD-10-CM

## 2025-01-14 DIAGNOSIS — I25.10 ATHEROSCLEROTIC HEART DISEASE OF NATIVE CORONARY ARTERY W/OUT ANGINA PECTORIS: ICD-10-CM

## 2025-01-14 PROCEDURE — 93000 ELECTROCARDIOGRAM COMPLETE: CPT

## 2025-01-14 PROCEDURE — G2211 COMPLEX E/M VISIT ADD ON: CPT

## 2025-01-14 PROCEDURE — 99215 OFFICE O/P EST HI 40 MIN: CPT

## 2025-01-14 RX ORDER — SODIUM ZIRCONIUM CYCLOSILICATE 10 G/10G
10 POWDER, FOR SUSPENSION ORAL
Refills: 0 | Status: ACTIVE | COMMUNITY
Start: 2025-01-14

## 2025-01-14 RX ORDER — CLOTRIMAZOLE 10 MG/1
10 LOZENGE ORAL DAILY
Refills: 0 | Status: ACTIVE | COMMUNITY
Start: 2025-01-14

## 2025-01-14 RX ORDER — FOLIC ACID 1 MG/1
1 TABLET ORAL DAILY
Qty: 30 | Refills: 5 | Status: ACTIVE | COMMUNITY
Start: 2025-01-14

## 2025-01-14 RX ORDER — PREDNISONE 5 MG/1
5 TABLET ORAL
Refills: 0 | Status: ACTIVE | COMMUNITY
Start: 2025-01-14

## 2025-01-14 RX ORDER — ACYCLOVIR 400 MG/1
400 TABLET ORAL TWICE DAILY
Refills: 0 | Status: ACTIVE | COMMUNITY
Start: 2025-01-14

## 2025-01-14 RX ORDER — INSULIN GLARGINE 100 [IU]/ML
100 INJECTION, SOLUTION SUBCUTANEOUS
Refills: 0 | Status: ACTIVE | COMMUNITY
Start: 2025-01-14

## 2025-01-14 RX ORDER — TACROLIMUS 1 MG/1
1 CAPSULE ORAL TWICE DAILY
Refills: 0 | Status: ACTIVE | COMMUNITY
Start: 2025-01-14

## 2025-01-14 RX ORDER — FAMOTIDINE 20 MG/1
20 TABLET, FILM COATED ORAL
Refills: 0 | Status: ACTIVE | COMMUNITY
Start: 2025-01-14

## 2025-01-14 RX ORDER — MYCOPHENOLATE MOFETIL 500 MG/1
500 TABLET ORAL TWICE DAILY
Refills: 0 | Status: ACTIVE | COMMUNITY
Start: 2025-01-14

## 2025-03-11 ENCOUNTER — APPOINTMENT (OUTPATIENT)
Dept: HEART AND VASCULAR | Facility: CLINIC | Age: 80
End: 2025-03-11
Payer: MEDICARE

## 2025-03-11 PROCEDURE — 93306 TTE W/DOPPLER COMPLETE: CPT

## 2025-03-12 ENCOUNTER — NON-APPOINTMENT (OUTPATIENT)
Age: 80
End: 2025-03-12

## 2025-03-17 ENCOUNTER — NON-APPOINTMENT (OUTPATIENT)
Age: 80
End: 2025-03-17

## 2025-03-17 ENCOUNTER — APPOINTMENT (OUTPATIENT)
Dept: HEART AND VASCULAR | Facility: CLINIC | Age: 80
End: 2025-03-17
Payer: MEDICARE

## 2025-03-17 VITALS
WEIGHT: 140.5 LBS | OXYGEN SATURATION: 98 % | HEIGHT: 70 IN | HEART RATE: 66 BPM | SYSTOLIC BLOOD PRESSURE: 137 MMHG | DIASTOLIC BLOOD PRESSURE: 73 MMHG | BODY MASS INDEX: 20.11 KG/M2

## 2025-03-17 VITALS — SYSTOLIC BLOOD PRESSURE: 130 MMHG | DIASTOLIC BLOOD PRESSURE: 79 MMHG

## 2025-03-17 DIAGNOSIS — I50.22 CHRONIC SYSTOLIC (CONGESTIVE) HEART FAILURE: ICD-10-CM

## 2025-03-17 DIAGNOSIS — I25.10 ATHEROSCLEROTIC HEART DISEASE OF NATIVE CORONARY ARTERY W/OUT ANGINA PECTORIS: ICD-10-CM

## 2025-03-17 DIAGNOSIS — I77.9 DISORDER OF ARTERIES AND ARTERIOLES, UNSPECIFIED: ICD-10-CM

## 2025-03-17 DIAGNOSIS — E78.5 HYPERLIPIDEMIA, UNSPECIFIED: ICD-10-CM

## 2025-03-17 DIAGNOSIS — I45.6 PRE-EXCITATION SYNDROME: ICD-10-CM

## 2025-03-17 DIAGNOSIS — R60.0 LOCALIZED EDEMA: ICD-10-CM

## 2025-03-17 DIAGNOSIS — M54.12 RADICULOPATHY, CERVICAL REGION: ICD-10-CM

## 2025-03-17 DIAGNOSIS — E11.9 TYPE 2 DIABETES MELLITUS W/OUT COMPLICATIONS: ICD-10-CM

## 2025-03-17 DIAGNOSIS — N18.5 CHRONIC KIDNEY DISEASE, STAGE 5: ICD-10-CM

## 2025-03-17 DIAGNOSIS — I10 ESSENTIAL (PRIMARY) HYPERTENSION: ICD-10-CM

## 2025-03-17 PROCEDURE — G2211 COMPLEX E/M VISIT ADD ON: CPT

## 2025-03-17 PROCEDURE — 99215 OFFICE O/P EST HI 40 MIN: CPT

## 2025-03-17 PROCEDURE — 93000 ELECTROCARDIOGRAM COMPLETE: CPT

## 2025-03-17 RX ORDER — SACUBITRIL AND VALSARTAN 24; 26 MG/1; MG/1
24-26 TABLET, FILM COATED ORAL TWICE DAILY
Qty: 180 | Refills: 3 | Status: ACTIVE | COMMUNITY
Start: 2025-03-17 | End: 1900-01-01

## 2025-03-25 ENCOUNTER — APPOINTMENT (OUTPATIENT)
Dept: NEUROSURGERY | Facility: CLINIC | Age: 80
End: 2025-03-25
Payer: MEDICARE

## 2025-03-25 VITALS
OXYGEN SATURATION: 98 % | SYSTOLIC BLOOD PRESSURE: 97 MMHG | HEART RATE: 80 BPM | WEIGHT: 140 LBS | DIASTOLIC BLOOD PRESSURE: 57 MMHG | BODY MASS INDEX: 20.04 KG/M2 | HEIGHT: 70 IN | RESPIRATION RATE: 18 BRPM

## 2025-03-25 DIAGNOSIS — R29.898 OTHER SYMPTOMS AND SIGNS INVOLVING THE MUSCULOSKELETAL SYSTEM: ICD-10-CM

## 2025-03-25 PROBLEM — M54.12 CERVICAL RADICULOPATHY: Status: ACTIVE | Noted: 2025-03-25

## 2025-03-25 PROCEDURE — 99203 OFFICE O/P NEW LOW 30 MIN: CPT

## 2025-03-27 ENCOUNTER — NON-APPOINTMENT (OUTPATIENT)
Age: 80
End: 2025-03-27

## 2025-04-01 ENCOUNTER — APPOINTMENT (OUTPATIENT)
Dept: MRI IMAGING | Age: 80
End: 2025-04-01
Payer: MEDICARE

## 2025-04-01 PROCEDURE — 72141 MRI NECK SPINE W/O DYE: CPT

## 2025-04-02 ENCOUNTER — APPOINTMENT (OUTPATIENT)
Dept: NEUROSURGERY | Facility: CLINIC | Age: 80
End: 2025-04-02
Payer: MEDICARE

## 2025-04-02 DIAGNOSIS — M54.12 RADICULOPATHY, CERVICAL REGION: ICD-10-CM

## 2025-04-02 DIAGNOSIS — M25.78 OTHER CERVICAL DISC DEGENERATION, UNSPECIFIED CERVICAL REGION: ICD-10-CM

## 2025-04-02 DIAGNOSIS — M50.30 OTHER CERVICAL DISC DEGENERATION, UNSPECIFIED CERVICAL REGION: ICD-10-CM

## 2025-04-02 DIAGNOSIS — M48.02 SPINAL STENOSIS, CERVICAL REGION: ICD-10-CM

## 2025-04-02 PROCEDURE — 99204 OFFICE O/P NEW MOD 45 MIN: CPT

## 2025-04-10 ENCOUNTER — APPOINTMENT (OUTPATIENT)
Dept: PHYSICAL MEDICINE AND REHAB | Facility: CLINIC | Age: 80
End: 2025-04-10

## 2025-04-10 VITALS
OXYGEN SATURATION: 98 % | SYSTOLIC BLOOD PRESSURE: 131 MMHG | WEIGHT: 140 LBS | HEIGHT: 70 IN | DIASTOLIC BLOOD PRESSURE: 72 MMHG | HEART RATE: 63 BPM | BODY MASS INDEX: 20.04 KG/M2

## 2025-04-10 PROCEDURE — 99205 OFFICE O/P NEW HI 60 MIN: CPT

## 2025-04-10 RX ORDER — GABAPENTIN 300 MG/1
300 CAPSULE ORAL
Qty: 30 | Refills: 6 | Status: ACTIVE | COMMUNITY
Start: 2025-04-10 | End: 1900-01-01

## 2025-05-22 ENCOUNTER — APPOINTMENT (OUTPATIENT)
Dept: HEART AND VASCULAR | Facility: CLINIC | Age: 80
End: 2025-05-22
Payer: MEDICARE

## 2025-05-22 VITALS
HEIGHT: 70 IN | HEART RATE: 80 BPM | DIASTOLIC BLOOD PRESSURE: 70 MMHG | SYSTOLIC BLOOD PRESSURE: 120 MMHG | WEIGHT: 142 LBS | TEMPERATURE: 97.3 F | OXYGEN SATURATION: 100 % | BODY MASS INDEX: 20.33 KG/M2

## 2025-05-22 DIAGNOSIS — E87.5 HYPERKALEMIA: ICD-10-CM

## 2025-05-22 DIAGNOSIS — R60.0 LOCALIZED EDEMA: ICD-10-CM

## 2025-05-22 DIAGNOSIS — I50.22 CHRONIC SYSTOLIC (CONGESTIVE) HEART FAILURE: ICD-10-CM

## 2025-05-22 DIAGNOSIS — E11.9 TYPE 2 DIABETES MELLITUS W/OUT COMPLICATIONS: ICD-10-CM

## 2025-05-22 DIAGNOSIS — R42 DIZZINESS AND GIDDINESS: ICD-10-CM

## 2025-05-22 DIAGNOSIS — I10 ESSENTIAL (PRIMARY) HYPERTENSION: ICD-10-CM

## 2025-05-22 DIAGNOSIS — I45.6 PRE-EXCITATION SYNDROME: ICD-10-CM

## 2025-05-22 DIAGNOSIS — I25.10 ATHEROSCLEROTIC HEART DISEASE OF NATIVE CORONARY ARTERY W/OUT ANGINA PECTORIS: ICD-10-CM

## 2025-05-22 DIAGNOSIS — N18.5 CHRONIC KIDNEY DISEASE, STAGE 5: ICD-10-CM

## 2025-05-22 DIAGNOSIS — E78.5 HYPERLIPIDEMIA, UNSPECIFIED: ICD-10-CM

## 2025-05-22 DIAGNOSIS — I77.9 DISORDER OF ARTERIES AND ARTERIOLES, UNSPECIFIED: ICD-10-CM

## 2025-05-22 PROCEDURE — G2211 COMPLEX E/M VISIT ADD ON: CPT

## 2025-05-22 PROCEDURE — 99215 OFFICE O/P EST HI 40 MIN: CPT

## 2025-05-22 RX ORDER — TAMSULOSIN HYDROCHLORIDE 0.4 MG/1
0.4 CAPSULE ORAL
Refills: 0 | Status: ACTIVE | COMMUNITY

## 2025-05-29 ENCOUNTER — APPOINTMENT (OUTPATIENT)
Dept: MRI IMAGING | Facility: CLINIC | Age: 80
End: 2025-05-29
Payer: MEDICARE

## 2025-05-29 ENCOUNTER — RESULT REVIEW (OUTPATIENT)
Age: 80
End: 2025-05-29

## 2025-05-29 PROCEDURE — 70551 MRI BRAIN STEM W/O DYE: CPT

## 2025-05-29 PROCEDURE — 74018 RADEX ABDOMEN 1 VIEW: CPT

## 2025-05-29 PROCEDURE — 71045 X-RAY EXAM CHEST 1 VIEW: CPT

## 2025-05-30 ENCOUNTER — NON-APPOINTMENT (OUTPATIENT)
Age: 80
End: 2025-05-30

## 2025-07-17 ENCOUNTER — APPOINTMENT (OUTPATIENT)
Dept: PHYSICAL MEDICINE AND REHAB | Facility: CLINIC | Age: 80
End: 2025-07-17
Payer: MEDICARE

## 2025-07-17 VITALS
DIASTOLIC BLOOD PRESSURE: 72 MMHG | OXYGEN SATURATION: 99 % | SYSTOLIC BLOOD PRESSURE: 136 MMHG | BODY MASS INDEX: 20.33 KG/M2 | HEIGHT: 70 IN | WEIGHT: 142 LBS | HEART RATE: 51 BPM

## 2025-07-17 PROCEDURE — 99214 OFFICE O/P EST MOD 30 MIN: CPT

## 2025-07-28 ENCOUNTER — APPOINTMENT (OUTPATIENT)
Dept: HEART AND VASCULAR | Facility: CLINIC | Age: 80
End: 2025-07-28
Payer: MEDICARE

## 2025-07-28 VITALS — SYSTOLIC BLOOD PRESSURE: 135 MMHG | DIASTOLIC BLOOD PRESSURE: 75 MMHG

## 2025-07-28 VITALS
BODY MASS INDEX: 21.62 KG/M2 | SYSTOLIC BLOOD PRESSURE: 144 MMHG | HEIGHT: 70 IN | WEIGHT: 151 LBS | HEART RATE: 64 BPM | DIASTOLIC BLOOD PRESSURE: 69 MMHG | TEMPERATURE: 97.6 F | OXYGEN SATURATION: 99 %

## 2025-07-28 DIAGNOSIS — R60.0 LOCALIZED EDEMA: ICD-10-CM

## 2025-07-28 DIAGNOSIS — E87.5 HYPERKALEMIA: ICD-10-CM

## 2025-07-28 DIAGNOSIS — E11.9 TYPE 2 DIABETES MELLITUS W/OUT COMPLICATIONS: ICD-10-CM

## 2025-07-28 DIAGNOSIS — N18.5 CHRONIC KIDNEY DISEASE, STAGE 5: ICD-10-CM

## 2025-07-28 DIAGNOSIS — I10 ESSENTIAL (PRIMARY) HYPERTENSION: ICD-10-CM

## 2025-07-28 DIAGNOSIS — I77.9 DISORDER OF ARTERIES AND ARTERIOLES, UNSPECIFIED: ICD-10-CM

## 2025-07-28 DIAGNOSIS — I25.10 ATHEROSCLEROTIC HEART DISEASE OF NATIVE CORONARY ARTERY W/OUT ANGINA PECTORIS: ICD-10-CM

## 2025-07-28 DIAGNOSIS — E78.5 HYPERLIPIDEMIA, UNSPECIFIED: ICD-10-CM

## 2025-07-28 DIAGNOSIS — I45.6 PRE-EXCITATION SYNDROME: ICD-10-CM

## 2025-07-28 DIAGNOSIS — I50.22 CHRONIC SYSTOLIC (CONGESTIVE) HEART FAILURE: ICD-10-CM

## 2025-07-28 DIAGNOSIS — Z94.0 KIDNEY TRANSPLANT STATUS: ICD-10-CM

## 2025-07-28 DIAGNOSIS — R42 DIZZINESS AND GIDDINESS: ICD-10-CM

## 2025-07-28 PROCEDURE — 99214 OFFICE O/P EST MOD 30 MIN: CPT

## 2025-07-28 PROCEDURE — G2211 COMPLEX E/M VISIT ADD ON: CPT

## 2025-07-28 RX ORDER — MYCOPHENILIC ACID 360 MG/1
360 TABLET, DELAYED RELEASE ORAL 3 TIMES DAILY
Refills: 0 | Status: ACTIVE | COMMUNITY

## 2025-07-28 RX ORDER — ATOVAQUONE 750 MG/5ML
750 SUSPENSION ORAL
Refills: 0 | Status: ACTIVE | COMMUNITY

## 2025-07-28 RX ORDER — HYDROCORTISONE 5 MG/1
5 TABLET ORAL
Refills: 0 | Status: ACTIVE | COMMUNITY

## 2025-08-12 ENCOUNTER — APPOINTMENT (OUTPATIENT)
Dept: HEART AND VASCULAR | Facility: CLINIC | Age: 80
End: 2025-08-12

## 2025-08-12 ENCOUNTER — APPOINTMENT (OUTPATIENT)
Dept: HEART AND VASCULAR | Facility: CLINIC | Age: 80
End: 2025-08-12
Payer: MEDICARE

## 2025-08-12 PROCEDURE — 93880 EXTRACRANIAL BILAT STUDY: CPT

## 2025-08-13 ENCOUNTER — NON-APPOINTMENT (OUTPATIENT)
Age: 80
End: 2025-08-13